# Patient Record
Sex: FEMALE | Race: WHITE | NOT HISPANIC OR LATINO | Employment: FULL TIME | URBAN - METROPOLITAN AREA
[De-identification: names, ages, dates, MRNs, and addresses within clinical notes are randomized per-mention and may not be internally consistent; named-entity substitution may affect disease eponyms.]

---

## 2023-12-15 ENCOUNTER — OFFICE VISIT (OUTPATIENT)
Dept: PRIMARY CARE CLINIC | Facility: CLINIC | Age: 43
End: 2023-12-15
Payer: COMMERCIAL

## 2023-12-15 VITALS
TEMPERATURE: 97 F | HEIGHT: 64 IN | OXYGEN SATURATION: 99 % | RESPIRATION RATE: 18 BRPM | HEART RATE: 93 BPM | DIASTOLIC BLOOD PRESSURE: 90 MMHG | WEIGHT: 233.69 LBS | BODY MASS INDEX: 39.9 KG/M2 | SYSTOLIC BLOOD PRESSURE: 134 MMHG

## 2023-12-15 DIAGNOSIS — E88.810 METABOLIC SYNDROME: ICD-10-CM

## 2023-12-15 DIAGNOSIS — Z83.3 FAMILY HISTORY OF DIABETES MELLITUS (DM): ICD-10-CM

## 2023-12-15 DIAGNOSIS — G43.909 MIGRAINE SYNDROME: ICD-10-CM

## 2023-12-15 DIAGNOSIS — E88.819 INSULIN RESISTANCE: ICD-10-CM

## 2023-12-15 DIAGNOSIS — F33.41 RECURRENT MAJOR DEPRESSIVE DISORDER, IN PARTIAL REMISSION: ICD-10-CM

## 2023-12-15 DIAGNOSIS — R63.5 WEIGHT GAIN: ICD-10-CM

## 2023-12-15 DIAGNOSIS — I10 ESSENTIAL HYPERTENSION: ICD-10-CM

## 2023-12-15 DIAGNOSIS — E66.01 CLASS 3 SEVERE OBESITY DUE TO EXCESS CALORIES WITH SERIOUS COMORBIDITY AND BODY MASS INDEX (BMI) OF 40.0 TO 44.9 IN ADULT: ICD-10-CM

## 2023-12-15 PROBLEM — E66.813 CLASS 3 SEVERE OBESITY DUE TO EXCESS CALORIES WITH SERIOUS COMORBIDITY AND BODY MASS INDEX (BMI) OF 40.0 TO 44.9 IN ADULT: Status: ACTIVE | Noted: 2023-12-15

## 2023-12-15 PROCEDURE — 99204 OFFICE O/P NEW MOD 45 MIN: CPT | Mod: S$GLB,,, | Performed by: FAMILY MEDICINE

## 2023-12-15 PROCEDURE — 99999 PR PBB SHADOW E&M-EST. PATIENT-LVL IV: CPT | Mod: PBBFAC,,, | Performed by: FAMILY MEDICINE

## 2023-12-15 PROCEDURE — 99214 OFFICE O/P EST MOD 30 MIN: CPT | Mod: PBBFAC | Performed by: FAMILY MEDICINE

## 2023-12-15 PROCEDURE — 99999 PR PBB SHADOW E&M-EST. PATIENT-LVL IV: ICD-10-PCS | Mod: PBBFAC,,, | Performed by: FAMILY MEDICINE

## 2023-12-15 PROCEDURE — 99204 PR OFFICE/OUTPT VISIT, NEW, LEVL IV, 45-59 MIN: ICD-10-PCS | Mod: S$GLB,,, | Performed by: FAMILY MEDICINE

## 2023-12-15 RX ORDER — NALTREXONE HYDROCHLORIDE 50 MG/1
TABLET, FILM COATED ORAL
Qty: 15 TABLET | Refills: 0 | Status: SHIPPED | OUTPATIENT
Start: 2023-12-15

## 2023-12-15 NOTE — PROGRESS NOTES
Subjective       Referring MD: Fredo  PCP:  Jimmy  BMI noted 40  Diet: variable  Exercise/Activity: light  Sleep: fair  Stressors: controlled  Anxiety/Depression Screen/PHQ-2: stable      Patient ID: Estefania Crenshaw is a 43 y.o. female.    Labs reviewed  Lipids, tsh okay  + fam hx of DM II    IR per panel      Chief Complaint: Establish Care (Weight loss)    Hx of chronic migraines--have been controlled. She did not respond well to topamax. Had some success with propranolol but had to change for better bp control. Now on relpax.     BP has overall been good.     Taking wellbutrin xl for depression. Depression stable without crisis but some increased stressors with work change (worked for law firm; now working from home). Tolerating wellbutrin; She tends to be an emotional eater.     Pt feels like weight has been increasing. She has an IR panel that is c/w insulin resistance.   Tried metformin and refuses to take again due to gi upset. Tried IR and XR with slow titration.     Hx of htn, some hyperglycemia and hyperinsulinemia on IR panel, waist size.     Has tried low carb keto as a jump start in the past.     Food recall:  Bfast: may skip toast, peanut butter, egg sandwich  Lunch: leftovers from dinner, sandwich  Dinner: cooks at home; does not enjoy:  protein, veggie, starch  Does not like veggies  Snack: chips, crackers, sweets ( enjoys)  Water: adequate--could do better  Sugar Sweetened beverages:   Diet pepsi  Etoh: none    HPI       Objective     PAST MEDICAL HISTORY:  Past Medical History:   Diagnosis Date    Anxiety     Depression     History of PCOS     Hypertension     Infertility, female     Migraine     Mixed hyperlipidemia     Thrombocytosis          PAST SURGICAL HISTORY:  Past Surgical History:   Procedure Laterality Date    CHOLECYSTECTOMY      DENTAL SURGERY      OOPHORECTOMY Left 2016       FAMILY HISTORY:  Family History   Problem Relation Age of Onset    Diabetes Mother     Endocrine  tumor Mother     Depression Mother     Heart disease Father     Diabetes Father     Skin cancer Father     Colon cancer Maternal Grandmother     Heart disease Maternal Grandfather     Heart disease Paternal Grandfather           SOCIAL HISTORY:  Social History     Social History Narrative    Not on file       MEDICATIONS:  Medications have been reviewed.    ALLERGIES:  Allergies have been reviewed.    Vitals:    12/15/23 1004   BP: (!) 134/90   Pulse: 93   Resp: 18   Temp: 97.1 °F (36.2 °C)     Wt Readings from Last 10 Encounters:   12/15/23 106 kg (233 lb 11 oz)   11/06/23 106.1 kg (234 lb)   10/31/22 96.6 kg (213 lb)   10/25/21 104.8 kg (231 lb)       Lab Results   Component Value Date    WBC 9.78 11/16/2023    HGB 12.8 11/16/2023    HCT 40.1 11/16/2023     11/16/2023    CHOL 196 11/16/2023    TRIG 103 11/16/2023    HDL 52 11/16/2023    ALT 16 11/16/2023    AST 21 11/16/2023     11/16/2023    K 4.3 11/16/2023     11/16/2023    CREATININE 0.7 11/16/2023    BUN 10 11/16/2023    CO2 25 11/16/2023    TSH 2.03 09/01/2023    HGBA1C 5.5 09/01/2023       Review of Systems   Constitutional:  Negative for activity change, appetite change, fatigue and fever.   HENT:  Negative for mouth dryness and goiter.    Eyes:  Negative for visual disturbance.   Respiratory:  Negative for apnea, cough, chest tightness and shortness of breath.    Cardiovascular:  Negative for chest pain, palpitations and leg swelling.   Gastrointestinal:  Negative for abdominal pain, constipation, diarrhea, nausea, vomiting and reflux.   Endocrine: Negative for cold intolerance, heat intolerance, polydipsia, polyphagia and polyuria.   Genitourinary:  Negative for frequency and menstrual problem.   Musculoskeletal:  Negative for arthralgias and myalgias.   Integumentary:  Negative for color change and rash.   Psychiatric/Behavioral:  Negative for self-injury, sleep disturbance and suicidal ideas. The patient is not nervous/anxious.         Physical Exam  Vitals and nursing note reviewed.   Constitutional:       General: She is not in acute distress.  HENT:      Head: Normocephalic and atraumatic.      Mouth/Throat:      Pharynx: Oropharynx is clear.   Eyes:      General: No scleral icterus.     Pupils: Pupils are equal, round, and reactive to light.   Neck:      Comments: No TM  Cardiovascular:      Rate and Rhythm: Normal rate and regular rhythm.      Pulses: Normal pulses.      Heart sounds: Normal heart sounds. No murmur heard.     No friction rub. No gallop.   Pulmonary:      Effort: Pulmonary effort is normal. No respiratory distress.      Breath sounds: Normal breath sounds. No wheezing, rhonchi or rales.   Abdominal:      General: Bowel sounds are normal. There is no distension.      Palpations: Abdomen is soft.      Tenderness: There is no abdominal tenderness.   Musculoskeletal:         General: No swelling.      Cervical back: Normal range of motion and neck supple. No tenderness.   Lymphadenopathy:      Cervical: No cervical adenopathy.   Skin:     General: Skin is warm.      Capillary Refill: Capillary refill takes less than 2 seconds.      Findings: No erythema or rash.   Neurological:      Mental Status: She is alert and oriented to person, place, and time.   Psychiatric:         Mood and Affect: Mood normal.         Behavior: Behavior normal.              Assessment and Plan       ICD-10-CM ICD-9-CM   1. Insulin resistance  E88.819 277.7   2. Metabolic syndrome  E88.810 277.7   3. Weight gain  R63.5 783.1   4. Recurrent major depressive disorder, in partial remission  F33.41 296.35   5. Migraine syndrome  G43.909 346.00   6. Essential hypertension  I10 401.9   7. Class 3 severe obesity due to excess calories with serious comorbidity and body mass index (BMI) of 40.0 to 44.9 in adult  E66.01 278.01    Z68.41 V85.41   8. Family history of diabetes mellitus (DM)  Z83.3 V18.0      Reviewed with pt risks/benefits/se's of naltrexone at  length  Will consider naltrexone; pt advised to monitor for any se's including: nausea, fogginess, mh changes, fatigue  Reviewed with pt should be off this med 7 days prior and resume 7 days after last dosage  Utd     Pt declines surgery  Insulin resistance  -     Ambulatory referral/consult to [facility]    Metabolic syndrome  Comments:  failed    Weight gain  -     Ambulatory referral/consult to [facility]    Recurrent major depressive disorder, in partial remission    Migraine syndrome  Chronic/stable    Essential hypertension  Chronic/stable    Class 3 severe obesity due to excess calories with serious comorbidity and body mass index (BMI) of 40.0 to 44.9 in adult    Reviewed with pt risks/benefits/se's of naltrexone at length  Will consider naltrexone; pt advised to monitor for any se's including: nausea, fogginess, mh changes, fatigue  Reviewed with pt should be off this med 7 days prior and resume 7 days after last dosage  Utd     Pt to check for glp1 coverage in future  Rx naltrexone sent in     Family history of diabetes mellitus (DM)           Follow up in about 8 weeks (around 2/9/2024), or if symptoms worsen or fail to improve.  Reviewed with pt glp1  Risks/benefits/common side effects of medication discussed with patient at length. UTD patient handout given. (mychart msg)  D/W pt at length potential pharmacologic options; she desires consideration of ozempic or mounjaro. Risks/benefits/common side effects of ozempic or mounjaro d/w pt including nausea and pain at injection site; she is aware should not get pregnant while taking and not approved while breastfeeding. NO personal/fam hx of MEN or medullary thyroid cancer. No hx of pancreatitis. Instructed pt how to use with demo pen; pt practiced in office. Pt advised if approved will get pt handout from pharmacy. Pt has no hx of thyroid nodules or biliary disease/gallstones. DW pt with substantial or rapid weight loss gallstones  could develop. Also dw pt glp-1 MOA and common side effects.     Pt is on birth control patches; feels is doing well; compliant

## 2023-12-15 NOTE — PATIENT INSTRUCTIONS
"  Check on your insurance's formulary (drug plan) web site to see if Wegovy (semaglutide 2.4 mg) is covered. You may also go to [email or web address]-[email or web address] to check coverage, but that information may not be as accurate.   Check for coverage for Zepbound (terzepatide  5, 10 and 15 mg).  These particular name brands are important. They are not the same as the diabetes brands, and will not be covered the same.      Goal: < 25 grams added sugar/day  Protein 30 grams protein/meal   1 starch or less/meal        Contrave    Please check with your neurologist: is it ok take naltrexone 12. 5 mg once daily?    Please send me [employer] update soon.    Access En[employer] Online for additional drug information, tools, and databases.  Copyright 0537-7190 [employer] All rights reserved.  Contributor Disclosures  (For additional information see "Naltrexone: Drug information")    You must carefully read the "Consumer Information Use and Disclaimer" below in order to understand and correctly use this information.  Brand Names:   Vivitrol    Brand Names: Derick  APO-Naltrexone;     ReVia    What is this drug used for?  It is used to help keep you alcohol-free.  It is used to help keep you opioid-free. Opioid drugs include heroin and prescription pain drugs like oxycodone and morphine.  It may be given to you for other reasons. Talk with the doctor.    What do I need to tell my doctor BEFORE I take this drug?  If you are allergic to this drug; any part of this drug; or any other drugs, foods, or substances. Tell your doctor about the allergy and what signs you had.  If you are taking an opioid drug like morphine or oxycodone, are addicted to an opioid drug, or are having withdrawal signs.  If you have taken a pain drug within the past 7 to 14 days.  This is not a list of all drugs or health problems that interact with this drug.  Tell your doctor and pharmacist about all of your drugs (prescription or OTC, " natural products, vitamins) and health problems. You must check to make sure that it is safe for you to take this drug with all of your drugs and health problems. Do not start, stop, or change the dose of any drug without checking with your doctor.    What are some things I need to know or do while I take this drug?  All products:  Tell all of your health care providers that you take this drug. This includes your doctors, nurses, pharmacists, and dentists.  Avoid driving and doing other tasks or actions that call for you to be alert until you see how this drug affects you.  Have blood work checked as you have been told by the doctor. Talk with the doctor.  This drug may affect certain lab tests. Tell all of your health care providers and lab workers that you take this drug.  Avoid drinking alcohol while taking this drug.  Do not take opioid drugs while you are taking this drug. Opioid drugs will not work. Do not take more opioid drugs to try to get them to work. Doing this may cause severe injury, coma, or death.  A drug called naloxone can be used to help treat an opioid overdose. Your doctor may order naloxone for you to keep with you if it is needed. If you have questions about how to get or use naloxone, talk with your doctor or pharmacist. If you think there has been an opioid overdose, get medical care right away even if naloxone has been used.  If you are addicted to opioid drugs and are given this drug, you may have signs of withdrawal. If you have questions, talk with your doctor.  Tell your doctor if you are pregnant, plan on getting pregnant, or are breast-feeding. You will need to talk about the benefits and risks to you and the baby.  Tablets:  Have patient safety card with you at all times.  People taking this drug to keep an opioid-free state may get more effects from opioid drugs when this drug is stopped. Even low amounts of opioid drugs or amounts that you have used before may lead to overdose and  death. If you have questions, talk with your doctor.  Injection:  After you get a dose of this drug, its blocking effect on opioids will go away over time. Low amounts of opioid drugs or amounts that you have used before may lead to overdose and death. This effect may also be seen when it is time for your next dose of this drug, if you miss a dose, if you stop treatment, or if you have gone through treatment and are opioid-free.  Very bad skin problems have happened where the shot was given. Sometimes surgery was needed for these skin problems. Talk with the doctor.  A type of lung infection caused by an allergic reaction has happened with this drug. This may need to be treated in a hospital.    What are some side effects that I need to call my doctor about right away?  WARNING/CAUTION: Even though it may be rare, some people may have very bad and sometimes deadly side effects when taking a drug. Tell your doctor or get medical help right away if you have any of the following signs or symptoms that may be related to a very bad side effect:  All products:  Signs of an allergic reaction, like rash; hives; itching; red, swollen, blistered, or peeling skin with or without fever; wheezing; tightness in the chest or throat; trouble breathing, swallowing, or talking; unusual hoarseness; or swelling of the mouth, face, lips, tongue, or throat.  Signs of liver problems like dark urine, tiredness, decreased appetite, upset stomach or stomach pain, light-colored stools, throwing up, or yellow skin or eyes.  Signs of high blood pressure like very bad headache or dizziness, passing out, or change in eyesight.  New or worse behavior or mood changes like depression or thoughts of suicide.  Feeling confused.  Trouble breathing, slow breathing, or shallow breathing.  Sex problems in males.  Injection:  Signs of lung or breathing problems like shortness of breath or other trouble breathing, cough, or fever.  Area that feels hard,  blisters, dark scab, lumps, open wound, pain, swelling, or other very bad skin irritation where the shot was given.    What are some other side effects of this drug?  All drugs may cause side effects. However, many people have no side effects or only have minor side effects. Call your doctor or get medical help if any of these side effects or any other side effects bother you or do not go away:  All products:  Feeling nervous and excitable.  Anxiety.  Headache.  Muscle cramps.  Constipation, diarrhea, stomach pain, upset stomach, throwing up, or decreased appetite.  Unusual thirst.  Trouble sleeping.  Feeling dizzy, sleepy, tired, or weak.  Back, muscle, or joint pain.  Signs of a common cold.  Tooth pain.  Injection:  Irritation where the shot is given.  Nose or throat irritation.  Dry mouth.  These are not all of the side effects that may occur. If you have questions about side effects, call your doctor. Call your doctor for medical advice about side effects.  You may report side effects to your national health agency.    How is this drug best taken?  Use this drug as ordered by your doctor. Read all information given to you. Follow all instructions closely.  Tablets:  Keep taking this drug as you have been told by your doctor or other health care provider, even if you feel well.  Injection:  It is given as a shot into a muscle.    What do I do if I miss a dose?  Tablets:  Take a missed dose as soon as you think about it.  If it is close to the time for your next dose, skip the missed dose and go back to your normal time.  Do not take 2 doses at the same time or extra doses.  Injection:  Call your doctor to find out what to do.    How do I store and/or throw out this drug?  Tablets:  Store at room temperature in a dry place. Do not store in a bathroom.  Injection:  If you need to store this drug at home, talk with your doctor, nurse, or pharmacist about how to store it.  All products:  Keep all drugs in a safe  place. Keep all drugs out of the reach of children and pets.  Throw away unused or  drugs. Do not flush down a toilet or pour down a drain unless you are told to do so. Check with your pharmacist if you have questions about the best way to throw out drugs. There may be drug take-back programs in your area.    General drug facts  If your symptoms or health problems do not get better or if they become worse, call your doctor.  Do not share your drugs with others and do not take anyone else's drugs.  Some drugs may have another patient information leaflet. If you have any questions about this drug, please talk with your doctor, nurse, pharmacist, or other health care provider.  If you think there has been an overdose, call your poison control center or get medical care right away. Be ready to tell or show what was taken, how much, and when it happened.    Last Reviewed Date  2021  Consumer Information Use and Disclaimer  This generalized information is a limited summary of diagnosis, treatment, and/or medication information. It is not meant to be comprehensive and should be used as a tool to help the user understand and/or assess potential diagnostic and treatment options. It does NOT include all information about conditions, treatments, medications, side effects, or risks that may apply to a specific patient. It is not intended to be medical advice or a substitute for the medical advice, diagnosis, or treatment of a health care provider based on the health care provider's examination and assessment of a patient's specific and unique circumstances. Patients must speak with a health care provider for complete information about their health, medical questions, and treatment options, including any risks or benefits regarding use of medications. This information does not endorse any treatments or medications as safe, effective, or approved for treating a specific patient. UpToDate, Inc. and its affiliates  "disclaim any warranty or liability relating to this information or the use thereof. The use of this information is governed by the Terms of Use, available at https://www.wolterskluwer.com/en/know/clinical-effectiveness-terms.    © 2023 UpToDate, Inc. and its affiliates and/or licensors. All rights reserved.  Use of getbetter! is subject to the Terms of Use.              PRODUCE  [] All fresh fruit   [] All fresh vegetables   [] All fresh herbs  [] All herb purees + pastes  [] Pre-spiralized vegetable noodles   [] Steam-In-The-Bag begetables  [] Riced cauliflower  [] Jicama sticks  [] Love Beets  all varieties  [] Wholly Guacamole  all varieties  [] Hummus  all varieties, chickpea + vegetable  [] Tofu Shirataki noodles    [] Tofu  all varieties  [] Tempeh  all varieties    PROTEIN  CHICKEN   [] Boneless, skinless breasts  [] Boneless, skinless thighs  [] Ground chicken breast, at least 93% lean  [] Chicken breast cutlet  [] Aidell's  Chicken Sausage + Chicken Meatballs    TURKEY   [] Turkey breast tenderloin   [] Ground turkey breast, at least 93% lean  [] Tonio Naturals  Turkey Sausage    BEEF  [] Tenderloin  [] Sirloin  [] Top Loin  [] Flank Steak  [] Round Steak  [] Filet  [] Lean ground beef, at least 93% lean + grass-fed preferable    PORK  [] Tenderloin  [] Pork Chop  [] Center Cut  [] [place] Naturals  No-Sugar Delgado    BISON  [] [place]  90 - 95% lean    SEAFOOD  [] All fresh fish + seafood; locally sourced when possible  [] Smoked salmon    HEAT + EAT ENTREES   [] Sven's Natural Foods  Chicken, Pork, Beef  [] Travon  "All Natural" Grilled Chicken Breast + Strips, all varieties    SAUCES SPREADS + DIPS  [] Bitchin Sauce  Original, Chipotle, Cilantro [place]  [] Roseanne's Kitchen  Tzatziki Yogurt Dip, Babaganoush, Hummus  [] Wholly Guacamole  all varieties  [] Hummus  all varieties  [] [place] Gringo Salsa  all varieties  [] Mrs. Kavin's Salsa  all varieties  [] Ayse's All Natural BBQ " Sauce  [] Primal Kitchen  Phillip, Ketchup, BBQ Sauce  [] Primal Kitchen Pasta Sauce  Roasted Garlic, Tomato Basil, no-dairy Vodka Sauce  [] Sal & Arlene's  HeartSmart Pasta Sauce    DAIRY/DAIRY SUBSTITUTES/EGGS  EGGS   [] All eggs  cage-free, pasture-raise preferable  [] Crepini  egg wraps  [] Vital Farms  Pasture-Raised Egg Bites  [] JUST Egg [vegan]     CREAMERS   [] [place]  Better Half, original + vanilla unsweetened  [] NutPods  all varieties    MILK   [] Horizon Organic  all varieties except chocolate  [] Organic Valley  all varieties except chocolate  [] Organic Valley  ultra-filtered, reduced fat milk     PLANT_BASED MILK ALTERNATIVES  [] All unsweetened almond milks  original, vanilla + chocolate  [] Ripple  unsweetened   [] Milkadamia  original +_ vanilla, unsweetened   [] Forager  original + vanilla, unsweetened   [] Silk Organic  soy milk, unsweetened  [] Oatly  unsweetened  [] [place]ia  regular + protein-fortified oat milk, unsweetened     CHEESES  [] Regular or reduced fat cheeses  [] BelGioso  Fresh Mozzarella Snack Packs, Parmesan Power-full Snack   [] Goat cheese  [] Fresh mozzarella  [] String cheese  all varieties  [] Yulia Cottage Cheese  [] Michelle's Cultured Cottage Cheese  [] Toya Life 'Just Like Mozzarella'  plant-based shreds and other varieties  [] Parmela Creamery  plant-based shredded cheese    YOGURT  [] Fage  2% low-fat, plain  [] Siggi's  plain, vanilla  [] Chobani Greek  nonfat + whole milk yogurt, plain   [] Chobani Less Sugar  all flavored varieties   [] Oikos Greek  nonfat, plain  [] Two Good  all varieties   [] Khmer Provisions  plain  [] [place] Organic  low-fat + nonfat, plain  [] [place] Farm  goat milk yogurt, plain  [] Kefit  unsweetened, plain  [] Forager  Greek style unsweetened, plain [dairy-free]  [] [place]  unsweetened Greek style, plain [dairy-free]  [] [place]  almond milk yogurt, vanilla or plain, unsweetened  [dairy-free]    FREEZER SECTION  FROZEN VEGGIES  [] All plain frozen veggies + greens [e.g. broccoli, brussels, carrots, okra, mushrooms, zucchini, yellow squash, butternut squash, kale, spinach, brigette greens]  [] Riced veggies [e.g. cauliflower, broccoli, butternut squash]  [] Edamame  all varieties  [] Green Giant  [] Veggie Spirals  [] Marinated Veggies [e.g. eggplant, peppers, zucchini]  [] Simply Steam [place] Sprouts  [] Birds Eye  [] Power Blend Italian Style  lentils, broccoli, kale, zucchini  []  P[place] Sprouts & Carrots  [] Oven Roasters Broccoli & Cauliflower  [] [place] Blend  [] Tattooed   [] Green Bean Blend  [] Farmer's Market Ratatouille  [] Butter Balsamic Glazed Vegetables  [] Riced Cauliflower & Quinoa Mediterranean Style  [] Pascale's Good Life  Southern Style Greens [sauteed kale + onion]    FROZEN FRUITS  [] All unsweetened frozen fruits  all varieties  [] Dole Fruits & Veggie Blends  Berries 'n Kale  [] Dole Mix-ins  Triple Berry     FROZEN ENTREES  [] The Good Kitchen meals  all varieties [ e.g. Chili Lime Chicken Over Riced Cauliflower]  [] Premium Paleo  Not Kulwinder Momma's Meatloaf  [] Primal Kitchen  Chicken Pesto + Steak Fajitas w/ Peppers & Onions  [] Eating Well Frozen Entrees  Butter Chicken Masala, Steak Carne Asada, Creamy Pesto Chicken, Chicken + Wild Rice Stroganoff, Yellow Barragan Chicken, Sun-dried Tomato Chicken, Chicken Lo Mein  [] Realgood Entree Bowls  [place] Inspired Beef Bowl over Riced Cauliflower, Chicken Burrito Bowl   [] Great Karma Coconut [place]  [] Asuncion's  Tamale Bryn with Black Beans, Vegetable Lasagna  [] Kashi Mayan [place] Bake  [] Healthy Choice  Simply Steamers Chicken Fried Rice  [] Basil Pesto Chicken & Ukrainian Style Pork Power Bowls  [] Tattooed   Enchilada Bowl  [] Germaine Farms  Spicy Black Bean Burgers    FROZEN PIZZAS  [] Cauli'flour Foods  Cauliflower Pizza Crusts  [] Outer Aisle  Cauliflower Crust  [] Asuncion's   Veggie Crust Cheese Pizza  [] Quest Pizza     VEGETARIAN PRODUCTS  [] Beyond Meat  ground 'meat' + grilled 'chicken' strips  [] Tofurkey  Original Italian Sausage + Original Tempeh  [] Gardein  Beefless Ground + Meatless Meatballs  [] eGrmaine Farms Grillers  Original Burger, Crumbles, Meatballs    ICE CREAMS + FROZEN DESSERTS  [] Halo Top  regular + keto series, pops  [] Rebel  ice cream + dessert sandwiches  [] Enlightened  ice cream + bars  [] Nightfood  ice cream  [] Realgood  ice cream  [] Arctic Zero Fit  frozen pint  [] The Frozen Farmer  sorbets  [] Wholly Rollies  Protein Balls, all varieties  [] Dream Pops  Coconut Latte    FROZEN BREAKFASTS  [] Realgood  Breakfast Sandwiches on Cauliflower Cheesy Bread  [] Rebel  ice cream + dessert sandwiches  [] Enlightened  ice cream + bars  [] Nightfood  ice cream  [] Realgood  ice cream  [] Arctic Zero Fit  frozen pint  [] The Frozen Farmer  sorbets  [] Wholly Rollies  Protein Balls, all varieties  [] Dream Pops  Coconut Latte    BREADS/BUNS/WRAPS  [] Patrick Bread: All Types - In Freezer Section   [] Flat Out Light Wraps - All Varieties   [] Flat Out Protein Up Carb Down Flat Bread   [] Kontos Whole Wheat Pocket Noemy   [] Glen BEKAH 100% Whole Wheat Tortillas   [] [facility] Activation Lifey Tortillas - Smart & Delicious; 50 or 80-calorie   [] Nature's Own 100% Whole Wheat Bread   [] Orowheat Healthful - 100% Whole Wheat Slice Bread and [place] Thins   [] Orowheat Healthful - Whole Wheat Nuts & Grain Bread; Flax & Seed Bread   [] Pepperidge Farm Natural Whole Grain 15 Bread   [] Pepperidge Farm Natural Whole Grain English Muffin - 100% Whole Wheat   [] [place] Farm Very Thin 100% Whole Wheat   [] Renuka Jean 45 Calories and Delightful   [] Lamonte' 100% Whole Wheat Thin-Sliced Bagels and English Muffins   [] Western Bagel: Perfect 10     GLUTEN FREE  [] Kiet's Gluten Free Bread   [] Telfair Bakehouse 7-Grain Gluten Free Bread     LEGUME PASTA   []  Explore Asian Organic Black Bean Spaghetti   [] Modern Table   [] Tolerant Foods       NUT BUTTERS & JELLIES    NUT BUTTERS   [] Better'n Chocolate: Coconut Chocolate Peanut Butter Spread   [] Better'n Peanut Butter - All Types   [] Earth Balance Coconut and Peanut Spread   [] Ravi's Nut [place]   [] MaraNatha: All Natural Roasted Cashew Butter - [place] or Creamy   [] MaraNatha: Roasted Peanut Butter   [] Nuts 'N More Peanut [place] - All Flavors   [] PB2 Powder - Original or Chocolate   [] Skippy Natural - Creamy, Super Chunk   [] Smart Balance Peanut Butter - [place] or Creamy   [] Peanut Butter & Company:   [] Smooth , Crunch Time, The Heat Is On, Old Fashioned Smooth, Mighty Nut- Powdered Peanut Butter, Squeeze Pack   [] Smucker's Natural Peanut Butter - [place] or Creamy   [] Sunbutter Nut Butter   [] Wild Friends Protein Peanut Butter/[place] o Butter - Vanilla or Chocolate     JELLIES  o Polaner's All Fruit   o Clearly Organic Best Choice: Strawberry Fruit Spread       SNACKS    BARS  [] Kashi Bars - Chewy or Crunchy; Honey [place] o Flax or Peanut Butter   [] KIND Bars - 5 Grams of Sugar or Less   [] KIND Protein Bars - Strong and KIND   [] Nature Valley Protein Bar - All Varieties   [] Nature Valley Roasted Nut Crunch - [place] Crunch; Peanut Crunch   [] Critical access hospital Valley Simple Nut Bar - Roasted Peanut & Honey   [] Critical access hospital Valley Simple Nut Bar - [place], Cashew & Sea Salt   [] Critical access hospital Valley Nut [place] Bar - Salted Caramel Peanut   [] Think Thin Protein Bars   [] Quest Bars, Power Crunch Bars, Pure Protein Bars     BEEF JERKY - NITRATE FREE   [] Game On   [] Grass Run Farms   [] Krave   [] Ostrim   [] Perky Jerky   [] Primal Strips Meatless Vegan Jerky   [] [place]     CHIPS   [] Beanitos Chips   [] Fruit Crisps - e.g. Brother's-All-Natural, Bare Fruit, Yoga Chips   [] Stacey's Soy Crisps: 1.3 ounce bag   [] Quest Protein Chips   [] Wasa Whole Wheat Crisp Bread     CRACKERS  [] Estefania's Gone Crackers   []  Nabisco Triscuit: Regular and Thin Crisp Crackers   [] Vans Say Cheese Crackers (G-F)     POPCORN/NUTS   [] Navdeep Sosa's Smart Pop Popcorn - Single Serving   [] 100-Calorie Pack of Nuts - All Varieties     PROTEIN POWDERS & DRINKS  []  Protein -  Whey Protein Powder   [] Garden of Life Raw Protein Powder   [] Iconic Ready-To-Drink Protein Drink   [] Toth One Protein Powder   [] VegaSport Protein Powder     SALSA/HUMMUS/DIPS   [] Eat Well Embrace Life: Zesty Sriracha Carrot o Hummus   [] Pre-Portioned Guacamole Packs   [] Nancy's   [] Tostitos Restaurant Style Salsa       SOUPS   [] Asuncion's Organic Soups - Lentil, Vegetable, Split Pea, Low-Sodium     CANNED GOODS   [] 100% Pure Pumpkin   [] BlueRunner Creole Cream-Style Red Beans or Navy Beans   [] Cajun Power Chicken Gumbo Base   [] Chicken of the [place] Aire Stonewall   [] Archana Fresh Cut Sliced Beets   [] Hormel Breast of Chicken in Water   [] LeSuer Tender Baby Whole Carrots   [] McIlhenny Tabasco Earth Starter   [] Novaist: Chunk Lite Tuna in Water, Gourmet Select Pouches   [] StarKist: Yellowfin Tuna Fillets   [] Trappey's: Kidney, Butter, Lucas, Black Eye, Field, and Black Beans   [] STANISLAV Santos's Turnip Greens or Vic Spinach     CONDIMENTS/ SAUCES/SPREADS/ SPICES  [] Vitor Perez's Magic Seasonings - Regular or Salt Free   [] Cruzito Topete's Sauces - All Flavors   [] Laughing Cow Light - All Flavors   [] Dash Salt-Free Marinade - All Flavors   [] Michael & Arlene's: Heart Smart Pasta Sauces   [] Tabasco     SALAD DRESSINGS  [] Bere's Naturals: Lite Honey Mustard   [] Toñito's Own: Lighten Up Salad Dressing - All Varieties   [] OPA Greek Yogurt Dressings - Ranch, Blue o Cheese, Caesar, Feta Dill     SWEETENERS  [] Sweet [place] Sweetener   [] Swerve   [] Truvia     BEVERAGES  [] Coconut Water   [] Crystal Light PURE - All Flavors   [] Honest Tea: Just Green Tea, Unsweetened   [] Kombucha Tea   [] [place]   [] [place] Sisters Bloody Estefania Mix    [] Metromint - Zero-Sugar; All Natural Flavored   [] Ramakrishna - Plain or Flavored   [] Irasema Magana   [] Steaz - Zero-Sugar, All-Natural, Sparkling Tea   [] Tea Bags: Any Brand - e.g. Nkechi, Yogi, Tazzo, Celestial   [] V8 100% Vegetable Juice   [] Vitamin Water Zero   [] Water   [] Zevia - Stevia Sweetened Soft Drink     BEER/ZARIA/LIQUORS  []Tejada's Premier Light 64 Calories   [] Bud Select - 55 Calories   [] [place] Sisters Bloody Estefania Mix   [] Man Genuine Draft - 64 Calories   [] Red or White Wine - All Varieties     CEREALS: HOT/COLD   [] Shayy'homa Phelan's Original Cereal  [] Sussy's Mill Oat Bran Hot Cereal - Cracked Wheat, Multi-Grain  [] Kashi GoLean Cereal  [] Kashi GoLean Hot Cereal packets - Vanilla; Honey Cinnamon  [] Félix's Special K Protein Cereal  [] Yobany's Steel Cut Surinamese Oatmeal  [] Nature's Path Smart Bran  [] Congregation Instant Oatmeal packet, Original  [] Congregation Old Fashioned Congregation Oats  [] Uncle Xavier's Whole Wheat & Flaxseed Original Cereal

## 2024-02-24 ENCOUNTER — OFFICE VISIT (OUTPATIENT)
Dept: URGENT CARE | Facility: CLINIC | Age: 44
End: 2024-02-24
Payer: COMMERCIAL

## 2024-02-24 VITALS
WEIGHT: 231.13 LBS | RESPIRATION RATE: 18 BRPM | BODY MASS INDEX: 39.46 KG/M2 | TEMPERATURE: 99 F | SYSTOLIC BLOOD PRESSURE: 152 MMHG | OXYGEN SATURATION: 98 % | DIASTOLIC BLOOD PRESSURE: 86 MMHG | HEIGHT: 64 IN | HEART RATE: 101 BPM

## 2024-02-24 DIAGNOSIS — R68.83 CHILLS (WITHOUT FEVER): Primary | ICD-10-CM

## 2024-02-24 DIAGNOSIS — R59.9 LYMPH NODE ENLARGEMENT: ICD-10-CM

## 2024-02-24 DIAGNOSIS — I10 ELEVATED BLOOD PRESSURE READING IN OFFICE WITH DIAGNOSIS OF HYPERTENSION: ICD-10-CM

## 2024-02-24 DIAGNOSIS — R53.83 FATIGUE, UNSPECIFIED TYPE: ICD-10-CM

## 2024-02-24 DIAGNOSIS — B00.1 COLD SORE: ICD-10-CM

## 2024-02-24 LAB
CTP QC/QA: YES
SARS-COV-2 AG RESP QL IA.RAPID: NEGATIVE

## 2024-02-24 PROCEDURE — 87811 SARS-COV-2 COVID19 W/OPTIC: CPT | Mod: QW,S$GLB,,

## 2024-02-24 PROCEDURE — 99203 OFFICE O/P NEW LOW 30 MIN: CPT | Mod: S$GLB,,,

## 2024-02-24 RX ORDER — ACYCLOVIR 400 MG/1
400 TABLET ORAL 3 TIMES DAILY
Qty: 30 TABLET | Refills: 0 | Status: SHIPPED | OUTPATIENT
Start: 2024-02-24 | End: 2024-03-05

## 2024-02-24 NOTE — PATIENT INSTRUCTIONS
Get plenty of rest  Drink plenty of fluids  Take antiviral medication as directed for at least 1 week  Continue to apply topical medication to cold sore  Warm compresses to swollen painful lymph nodes    - You must understand that you have received an Urgent Care treatment only and that you may be released before all of your medical problems are known or treated.   - You, the patient, will arrange for follow up care as instructed with your primary care provider or recommended specialist.   - If your condition worsens or fails to improve we recommend that you receive another evaluation at the ER immediately or contact your PCP to discuss your concerns, or return here.   - Please do not drive or make any important decisions for 24 hours if you have received any pain medications, sedatives or mood altering drugs during your visit.    Disclaimer: This document was drafted with the use of a voice recognition device and is likely to have sound alike errors.

## 2024-02-24 NOTE — PROGRESS NOTES
"Subjective:      Patient ID: Estefania Crenshaw is a 43 y.o. female.    Vitals:  height is 5' 4" (1.626 m) and weight is 104.9 kg (231 lb 2.4 oz). Her tympanic temperature is 98.6 °F (37 °C). Her blood pressure is 152/86 (abnormal) and her pulse is 101. Her respiration is 18 and oxygen saturation is 98%.     Chief Complaint: Diarrhea    42 yo female Patient presents with chills and possible low grade fever, cold sore on L upper lip, and feeling fatigued starting 2 days ago. She had been applying abbreva OTC medication without improvement to cold sore. Also tried taking tylenol. Noticed left her of neck was more swollen and painful lymph nodes present so she tried applying warm compresses to area without improvement. Patient states she took an at home covid test 2 days ago and was negative.  Patient also mentioned she suffers "with a little bit of IBS and having diarrhea" but normally just deals with it. Denies n/v, current fever, dental pain, sore throat, trouble breathing, cp, history of tonsil stones, ear pain or discharge. Allergic to codeine.      Of note, BP was high when she entered clinic. States she is due to take her BP meds when she gets home. Denies headache, vision changes, speech difficulty, back pain, urinary symptoms.     Diarrhea   This is a new problem. The current episode started in the past 7 days. The problem occurs 2 to 4 times per day. The problem has been unchanged. The stool consistency is described as Watery. The patient states that diarrhea does not awaken her from sleep. Associated symptoms include chills and a fever (possible low grade fever but did not check). Pertinent negatives include no abdominal pain, arthralgias, bloating, coughing, headaches, increased  flatus, myalgias, URI or vomiting. Nothing aggravates the symptoms. She has tried analgesics for the symptoms. The treatment provided no relief. There is no history of bowel resection, inflammatory bowel disease, irritable bowel " syndrome, malabsorption, a recent abdominal surgery or short gut syndrome.       Constitution: Positive for chills and fever (possible low grade fever but did not check).   HENT:  Positive for mouth sores (cold sore) and facial swelling. Negative for ear pain, ear discharge, dental problem, sore throat, trouble swallowing and voice change.    Cardiovascular:  Negative for chest pain.   Eyes:  Negative for eye discharge, eye itching and eye redness.   Respiratory:  Negative for cough and shortness of breath.    Gastrointestinal:  Negative for abdominal pain, nausea and vomiting.   Genitourinary:  Negative for dysuria, frequency and urgency.   Musculoskeletal:  Negative for joint pain and muscle ache.   Skin:  Negative for rash.   Allergic/Immunologic: Negative for sneezing.   Neurological:  Negative for headaches.      Objective:     Vitals:    02/24/24 1615   BP: (!) 152/86   Pulse:    Resp:    Temp:        Physical Exam   Constitutional: She is oriented to person, place, and time. She appears well-developed. She is cooperative.  Non-toxic appearance. She does not appear ill. No distress.   HENT:   Head: Normocephalic and atraumatic.   Ears:   Right Ear: Hearing, tympanic membrane, external ear and ear canal normal. No no drainage, swelling or cerumen not present. Tympanic membrane is not erythematous and not bulging. No middle ear effusion. impacted cerumen  Left Ear: Hearing, tympanic membrane, external ear and ear canal normal. No no drainage, swelling or cerumen not present. Tympanic membrane is not erythematous and not bulging.  No middle ear effusion. impacted cerumen  Nose: Nose normal. No mucosal edema, rhinorrhea or nasal deformity. No epistaxis. Right sinus exhibits no maxillary sinus tenderness and no frontal sinus tenderness. Left sinus exhibits no maxillary sinus tenderness and no frontal sinus tenderness.   Mouth/Throat: Uvula is midline, oropharynx is clear and moist and mucous membranes are normal.  Mucous membranes are moist. Mucous membranes are not dry. No trismus in the jaw. Normal dentition. No dental abscesses or uvula swelling. No oropharyngeal exudate, posterior oropharyngeal edema, posterior oropharyngeal erythema, tonsillar abscesses or cobblestoning. Tonsils are 1+ on the right. Tonsils are 1+ on the left. No tonsillar exudate.   Eyes: Conjunctivae and lids are normal. Pupils are equal, round, and reactive to light. Right eye exhibits no discharge. Left eye exhibits no discharge. No scleral icterus.   Neck: Trachea normal and phonation normal. Neck supple. No edema present. No erythema present. No neck rigidity present. No decreased range of motion present. No pain with movement present.   Cardiovascular: Normal rate, regular rhythm, normal heart sounds and normal pulses.   Pulmonary/Chest: Effort normal and breath sounds normal. No stridor. No respiratory distress. She has no decreased breath sounds. She has no wheezes. She has no rhonchi. She has no rales.   Abdominal: Normal appearance and bowel sounds are normal. She exhibits no distension. Soft. There is no abdominal tenderness. There is no rebound and no guarding.   Musculoskeletal: Normal range of motion.         General: No deformity. Normal range of motion.   Lymphadenopathy:        Head (right side): Submandibular and tonsillar adenopathy present. No preauricular and no posterior auricular adenopathy present.        Head (left side): Submandibular and tonsillar adenopathy present. No preauricular and no posterior auricular adenopathy present.   Neurological: She is alert and oriented to person, place, and time. She displays no weakness. She exhibits normal muscle tone. Coordination and gait normal.   Skin: Skin is warm, dry, intact, not diaphoretic, not pale and no rash.   Psychiatric: Her speech is normal and behavior is normal. Judgment and thought content normal.   Nursing note and vitals reviewed.      Assessment:     1. Chills (without  fever)    2. Cold sore    3. Lymph node enlargement    4. Fatigue, unspecified type    5. Elevated blood pressure reading in office with diagnosis of hypertension      Results for orders placed or performed in visit on 02/24/24   SARS Coronavirus 2 Antigen, POCT Manual Read   Result Value Ref Range    SARS Coronavirus 2 Antigen Negative Negative     Acceptable Yes        Plan:       Chills (without fever)  -     SARS Coronavirus 2 Antigen, POCT Manual Read    Cold sore  -     acyclovir (ZOVIRAX) 400 MG tablet; Take 1 tablet (400 mg total) by mouth 3 (three) times daily. for 10 days  Dispense: 30 tablet; Refill: 0    Lymph node enlargement  -     SARS Coronavirus 2 Antigen, POCT Manual Read    Fatigue, unspecified type  -     SARS Coronavirus 2 Antigen, POCT Manual Read    Elevated blood pressure reading in office with diagnosis of hypertension        Patient Instructions   Get plenty of rest  Drink plenty of fluids  Take antiviral medication as directed for at least 1 week  Continue to apply topical medication to cold sore  Warm compresses to swollen painful lymph nodes    - You must understand that you have received an Urgent Care treatment only and that you may be released before all of your medical problems are known or treated.   - You, the patient, will arrange for follow up care as instructed with your primary care provider or recommended specialist.   - If your condition worsens or fails to improve we recommend that you receive another evaluation at the ER immediately or contact your PCP to discuss your concerns, or return here.   - Please do not drive or make any important decisions for 24 hours if you have received any pain medications, sedatives or mood altering drugs during your visit.    Disclaimer: This document was drafted with the use of a voice recognition device and is likely to have sound alike errors.

## 2024-05-14 ENCOUNTER — OFFICE VISIT (OUTPATIENT)
Dept: PRIMARY CARE CLINIC | Facility: CLINIC | Age: 44
End: 2024-05-14
Payer: COMMERCIAL

## 2024-05-14 VITALS
RESPIRATION RATE: 18 BRPM | DIASTOLIC BLOOD PRESSURE: 84 MMHG | BODY MASS INDEX: 39.52 KG/M2 | HEIGHT: 64 IN | TEMPERATURE: 98 F | OXYGEN SATURATION: 97 % | WEIGHT: 231.5 LBS | SYSTOLIC BLOOD PRESSURE: 138 MMHG | HEART RATE: 102 BPM

## 2024-05-14 DIAGNOSIS — E28.2 PCOS (POLYCYSTIC OVARIAN SYNDROME): ICD-10-CM

## 2024-05-14 DIAGNOSIS — I10 ESSENTIAL HYPERTENSION: Primary | ICD-10-CM

## 2024-05-14 DIAGNOSIS — E88.819 INSULIN RESISTANCE: ICD-10-CM

## 2024-05-14 DIAGNOSIS — E66.01 CLASS 2 SEVERE OBESITY WITH SERIOUS COMORBIDITY AND BODY MASS INDEX (BMI) OF 39.0 TO 39.9 IN ADULT, UNSPECIFIED OBESITY TYPE: ICD-10-CM

## 2024-05-14 DIAGNOSIS — Z83.3 FAMILY HISTORY OF DIABETES MELLITUS (DM): ICD-10-CM

## 2024-05-14 PROBLEM — E66.812 CLASS 2 SEVERE OBESITY WITH SERIOUS COMORBIDITY AND BODY MASS INDEX (BMI) OF 39.0 TO 39.9 IN ADULT: Status: ACTIVE | Noted: 2023-12-15

## 2024-05-14 PROCEDURE — 3075F SYST BP GE 130 - 139MM HG: CPT | Mod: CPTII,S$GLB,, | Performed by: FAMILY MEDICINE

## 2024-05-14 PROCEDURE — 1160F RVW MEDS BY RX/DR IN RCRD: CPT | Mod: CPTII,S$GLB,, | Performed by: FAMILY MEDICINE

## 2024-05-14 PROCEDURE — 99999 PR PBB SHADOW E&M-EST. PATIENT-LVL V: CPT | Mod: PBBFAC,,, | Performed by: FAMILY MEDICINE

## 2024-05-14 PROCEDURE — 3008F BODY MASS INDEX DOCD: CPT | Mod: CPTII,S$GLB,, | Performed by: FAMILY MEDICINE

## 2024-05-14 PROCEDURE — 1159F MED LIST DOCD IN RCRD: CPT | Mod: CPTII,S$GLB,, | Performed by: FAMILY MEDICINE

## 2024-05-14 PROCEDURE — 3079F DIAST BP 80-89 MM HG: CPT | Mod: CPTII,S$GLB,, | Performed by: FAMILY MEDICINE

## 2024-05-14 PROCEDURE — 4010F ACE/ARB THERAPY RXD/TAKEN: CPT | Mod: CPTII,S$GLB,, | Performed by: FAMILY MEDICINE

## 2024-05-14 PROCEDURE — 99214 OFFICE O/P EST MOD 30 MIN: CPT | Mod: S$GLB,,, | Performed by: FAMILY MEDICINE

## 2024-05-14 RX ORDER — NARATRIPTAN 2.5 MG/1
TABLET ORAL
COMMUNITY

## 2024-05-14 NOTE — PROGRESS NOTES
Subjective     Patient ID: Estefania Crenshaw is a 43 y.o. female.    Chief Complaint: follow up    LOV 12/23 (only visit)    IR per IR panel (Dr Yoo)    Hx of migraines; did not respond well to topamax.     On wellbutrin xl. Still on wellbutrin. Some emotional eating. Gave pt naltrexone. Had gi upset so stopped it (diarrhea). Pt feels like has a more sensitive stomach.     Failed regular and xr metformin due to gi upset.     GLP-1 not covered per pt.    Dietary interventions:  Lifestyle changes: not as much exercise    BP stable on losartan.      Had recent botox for HA.     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       HPI       Objective     PAST MEDICAL HISTORY:  Past Medical History:   Diagnosis Date    Anxiety     Depression     History of PCOS     Hypertension     Infertility, female     Migraine     Mixed hyperlipidemia     Thrombocytosis          PAST SURGICAL HISTORY:  Past Surgical History:   Procedure Laterality Date    CHOLECYSTECTOMY      DENTAL SURGERY      OOPHORECTOMY Left 2016       FAMILY HISTORY:  Family History   Problem Relation Name Age of Onset    Diabetes Mother      Endocrine tumor Mother      Depression Mother      Heart disease Father      Diabetes Father      Skin cancer Father      Colon cancer Maternal Grandmother      Heart disease Maternal Grandfather      Heart disease Paternal Grandfather            SOCIAL HISTORY:  Social History     Social History Narrative    Not on file       MEDICATIONS:  Medications have been reviewed.    ALLERGIES:  Allergies have been reviewed.    Vitals:    05/14/24 1534   BP: 138/84   Pulse: 102   Resp: 18   Temp: 98.1 °F (36.7 °C)     Wt Readings from Last 10 Encounters:   05/14/24 105 kg (231 lb 7.7 oz)   02/24/24 104.9 kg (231 lb 2.4 oz)   12/15/23 106 kg (233 lb 11 oz)   11/06/23 106.1 kg (234 lb)   10/31/22 96.6 kg (213 lb)   10/25/21 104.8 kg (231 lb)       Lab Results    Component Value Date    WBC 9.78 11/16/2023    HGB 12.8 11/16/2023    HCT 40.1 11/16/2023     11/16/2023    CHOL 196 11/16/2023    TRIG 103 11/16/2023    HDL 52 11/16/2023    ALT 16 11/16/2023    AST 21 11/16/2023     11/16/2023    K 4.3 11/16/2023     11/16/2023    CREATININE 0.7 11/16/2023    BUN 10 11/16/2023    CO2 25 11/16/2023    TSH 2.03 09/01/2023    HGBA1C 5.5 09/01/2023       Review of Systems   Constitutional:  Negative for activity change, appetite change, fatigue and fever.   HENT:  Negative for mouth dryness and goiter.    Eyes:  Negative for visual disturbance.   Respiratory:  Negative for apnea, cough, chest tightness and shortness of breath.    Cardiovascular:  Negative for chest pain, palpitations and leg swelling.   Gastrointestinal:  Negative for abdominal pain, constipation, diarrhea, nausea, vomiting and reflux.   Endocrine: Negative for cold intolerance, heat intolerance, polydipsia, polyphagia and polyuria.   Genitourinary:  Negative for frequency and menstrual problem.   Musculoskeletal:  Negative for arthralgias and myalgias.   Integumentary:  Negative for color change and rash.   Neurological:  Negative for dizziness, vertigo, tremors, syncope, weakness and headaches.   Psychiatric/Behavioral:  Negative for self-injury, sleep disturbance and suicidal ideas. The patient is not nervous/anxious.        Physical Exam  Vitals and nursing note reviewed.   Constitutional:       General: She is not in acute distress.  HENT:      Head: Normocephalic and atraumatic.      Mouth/Throat:      Pharynx: Oropharynx is clear.   Eyes:      General: No scleral icterus.     Pupils: Pupils are equal, round, and reactive to light.   Neck:      Comments: No TM  Cardiovascular:      Rate and Rhythm: Normal rate and regular rhythm.      Pulses: Normal pulses.      Heart sounds: Normal heart sounds. No murmur heard.     No friction rub. No gallop.   Pulmonary:      Effort: Pulmonary effort is  normal. No respiratory distress.      Breath sounds: Normal breath sounds. No wheezing, rhonchi or rales.   Abdominal:      General: Bowel sounds are normal. There is no distension.      Palpations: Abdomen is soft.      Tenderness: There is no abdominal tenderness.   Musculoskeletal:         General: No swelling.      Cervical back: Normal range of motion and neck supple. No tenderness.   Lymphadenopathy:      Cervical: No cervical adenopathy.   Skin:     General: Skin is warm.      Findings: No erythema or rash.   Neurological:      Mental Status: She is alert and oriented to person, place, and time.   Psychiatric:         Mood and Affect: Mood normal.         Behavior: Behavior normal.              Assessment and Plan       ICD-10-CM ICD-9-CM   1. Essential hypertension  I10 401.9   2. Family history of diabetes mellitus (DM)  Z83.3 V18.0   3. Class 2 severe obesity with serious comorbidity and body mass index (BMI) of 39.0 to 39.9 in adult, unspecified obesity type  E66.01 278.01    Z68.39 V85.39   4. Insulin resistance  E88.819 277.7   5. PCOS (polycystic ovarian syndrome)  E28.2 256.4      Essential hypertension  Comments:  bp stable  Orders:  -     Ambulatory Referral/Consult to Lifestyle Nutrition; Future; Expected date: 05/21/2024    Family history of diabetes mellitus (DM)    Class 2 severe obesity with serious comorbidity and body mass index (BMI) of 39.0 to 39.9 in adult, unspecified obesity type  -     Ambulatory Referral/Consult to Lifestyle Nutrition; Future; Expected date: 05/21/2024  -     Ambulatory referral/consult to Endocrinology; Future; Expected date: 05/21/2024    Insulin resistance  -     Ambulatory Referral/Consult to Lifestyle Nutrition; Future; Expected date: 05/21/2024  -     Ambulatory referral/consult to Endocrinology; Future; Expected date: 05/21/2024    PCOS (polycystic ovarian syndrome)  -     Ambulatory referral/consult to Endocrinology; Future; Expected date:  05/21/2024      Reviewed with pt AOMs--  Felt fuzzy with topamax    Make/keep pcp appt  Continue lifestyle changes     Pt made aware I will be leaving [facility] in June. She has established pcp so have advised her f/u with them this summer.   Also reviewed potential external community options.    Follow up if symptoms worsen or fail to improve.

## 2024-07-11 ENCOUNTER — NUTRITION (OUTPATIENT)
Dept: PRIMARY CARE CLINIC | Facility: CLINIC | Age: 44
End: 2024-07-11
Payer: COMMERCIAL

## 2024-07-11 DIAGNOSIS — E66.01 CLASS 2 SEVERE OBESITY WITH SERIOUS COMORBIDITY AND BODY MASS INDEX (BMI) OF 39.0 TO 39.9 IN ADULT, UNSPECIFIED OBESITY TYPE: ICD-10-CM

## 2024-07-11 DIAGNOSIS — I10 ESSENTIAL HYPERTENSION: ICD-10-CM

## 2024-07-11 DIAGNOSIS — Z71.3 DIETARY COUNSELING AND SURVEILLANCE: Primary | ICD-10-CM

## 2024-07-11 PROCEDURE — 99999 PR PBB SHADOW E&M-EST. PATIENT-LVL I: CPT | Mod: PBBFAC,,, | Performed by: DIETITIAN, REGISTERED

## 2024-07-11 PROCEDURE — 97802 MEDICAL NUTRITION INDIV IN: CPT | Mod: S$GLB,,, | Performed by: DIETITIAN, REGISTERED

## 2024-07-11 NOTE — PATIENT INSTRUCTIONS
Name: Estefania Crenshaw  Date: 7/11/24  NUTRITION RECOMMENDATIONS    Action Items:  Healthy rate of weight loss to maintain weight off is 0.5-1.5 pounds per week  Physical activity recommendations:  At least 150 minutes per week  At least 2 days per week of strength training  Increase potassium containing foods to help offset sodium  Consistency is key over all else  Focus on having adequate protein throughout the day  At least 20 grams at breakfast  At least 30 grams at lunch/dinner  Aim for at least 10 grams for sancks  Fill your plate with fiber  Aim for at least 1-2 cups of produce (fruit + non-starchy vegetables) at most meals  Limit carbs/starchy veggies to no more than 1 cup total per meal  Fuel your body every 3-4 hours    High Potassium Foods:  Oranges, cantaloupe, honeydew, grapefruit, dates  Cooked spinach  Cooked broccoli  Potatoes / sweet potatoes  Peas  Cucumbers  Zucchni  Pumpkins  Leafy greens  Beans   Whole grains  Favorite Websites for Recipe Inspiration  Skinnytaste  Real Food Dietitians   Eating Well  Budget Bytes  Eating Bird Food  Fit Foodie Finds  Wholesome Yum   Oh Snap Macros   35 Macro Friendly Meal Prep Recipes   9 Meal Prep Success Tips     Additional Recommendations  Limit added sugars to <25g per day  Aim to get ½ your body weight in fluid + additional 20 ounces for each hour of activity  Exercise elayne and website recommendations:  Street Parking - typically <30 minutes and higher intensity  [employer]  - elayne  FitBod - weight lifting elayne, can adapt to any level / equipment    Moves  - elayne  [employer]  - elayne  YouTube:  [facility] [employer]   The Studio by Omari Iverson   Yoga with Cher Vera   Body Project   Move with [employer] I recommend following for tips and ideas:  Dietitian Savanah Nicholson Nutritionist  Carlton Qureshi, MAGDI De Jesus  Lean Protein Sources:  Chicken breast  Boneless, skinless chicken thighs  Eggs  or egg whites/beaters  Pork tenderloin  93/7 or 90/10 ground beef/turkey/chicken  Sirloin, flank, filet, eye of round steak  Center cut gutierrez  Pork chop, thin cut, boneless  Beef jerky  Deli meat (chicken, ham, turkey)  [place]  Catfish  Tuna, canned in water  Oysters   Crab, shrimp, crawfish  Low-fat dairy, any kind  30 grams of protein cheat sheet:  3.5 ounces cooked chicken breast // 172 calories  4 ounces ground turkey breast // 120 calories  5 ounces [place] strip steak // 325 calories  5 ounces shrimp // 170 calories  6 ounces cod // 145 calories  5 ounces tuna fish // 165 calories  5 ounces salmon // 300 calories  8 ounces extra firm tofu // 250 calories  1.5 scoops protein powder // 160 calories  1.5 cups edamame // 280 calories  2 cups black beans // 485 calories  5 eggs // 390 calories  1.25 cups egg whites or egg beaters // 155 calories  When looking for a lean protein look for protein > fat on the nutrition label if fat > protein it is considered a high fat meat and should be limited during times of weight loss  Non-Starchy Vegetables include all vegetables except:  Potatoes / sweet potatoes  Corn  Peas  Fischer beans  Winter squash such as acorn squash  High Fiber Carbohydrate Sources:  Beans / lentils  Whole grains  Potatoes with skin on   Quinoa  Rice  Peas  Corn  Fischer beans  Carb balance tortillas or flatout wraps  All fruits  Oatmeal   High Fiber Foods  Raspberries, 1 cup, 8 grams  Pear, 1 medium, 5.5 grams  Apple with skin, 1 medium, 4.5  grams   Green peas, 1 cup, 9 grams   Broccoli, 1 cup chopped, 5 grams   [employer] sprouts, 1 cup, 4 grams   Whole wheat pasta, 1 cup 6 grams   Quinoa cooked, 1 cup 5 grams   Popcorn, plain 3 cups, 3.5 grams   Lentils, 1 cup. 15.5 grams   Emeka seeds, 1 tablespoons ,3 grams   Almonds, 1 ounce, 3.5 grams   Black beans, 1/2 cup, 7.5 grams   Renuka Ted Delightful bread, 2 slices, 5 grams   Carb balance tortillas, 1 soft taco sized, 15 grams   Frozen Meal Guidelines:  <400  calories  20g or greater protein  Top with rotisserie chicken for extra protein  Pair with additional fiber on the side to make it filling  Piece of fruit, ½ a salad kit, bag of frozen vegetables  Estimated Nutrition Needs  Calories: 1600 to 1700  Protein: 100 to 130 grams per day  Fiber: At least 25 grams per day    Breakfast Ideas  300-500 calories, at least 20g protein    Idea #1:  408 calories, 32g protein, 34g carbs, 16g fat  ½ cup egg whites/beaters scrambled  2 Veggies Made Great cinnamon roll muffins  3 turkey or chicken breakfast sausage links  1 cup fresh strawberries  Idea #2:   322 calories, 30g protein, 41g carbs, 7g fat  Yogurt Parfait  2/3 - 1 cup Oikos Triple Zero Greek yogurt, any flavor  1 cup mixed berries  ½ cup cereal  1 tbsp neisha seeds  Idea #3:   436 calories, 38g protein, 44g carbohydrate, 12g fat  ½ cup egg whites/beaters scrambled  2 [place] cake buttermilk & vanilla waffles  ½ cup low sugar vanilla Greek yogurt  ½ cup fresh blueberries  2 tbsp sugar-free syrup  Idea #4:  High Protein Oatmeal  High Protein Overnight Oats    Nutrition for plain recipe per servin calories, 38g protein, 40g carbohydrate, 9g fat  Customizable High Protein Baked Oatmeal  Nutrition for plain recipe per servin kcal, 20g protein, 38g carbs, 9g fat  Idea #5:  300 calories, 21g protein, 18g carbohydrate, 16g fat  Meal Prep Breakfast Burritos    Idea #6:  345 calories, 42g protein, 24g carbohydrate, 9g fat  Basic Protein Smoothie  1 scoop protein powder  1 cup frozen fruit  ½ cup frozen cauliflower rice  8 ounces Fairlife 2% milk or unsweetened soy milk  1 tablespoon neisha seeds  Optional add-ins: peanut butter or PB2, ground flax seed, coconut flakes, sugar-free pudding mix, handful baby spinach, ½ cup pickled beets    Idea #7:  380 calories, 23g protein, 41g carbs, 15g fat  Breakfast Logan  2 slices white bread  2 eggs  1 slice 2% American cheese  1 cup cantaloupe    Idea #8:  320 calories, 23g protein,  39g carbs, 12g fat  1 cup Ratio cereal + 1 cup Fairlife 2% milk + ½ cup frozen blueberries    Idea #9:  446 calories, 19g protein, 42g carbs, 24g fat  2 eggs cooked with spray olive oil  2 slices white bread  1 portion container smashed avocado  ½ cup chopped pineapple  Idea #10:  475 calories, 28g protein, 34g carbs, 26g fat  Omelet:  1 egg + ¼ cup egg whites  Unlimited non-starchy vegetables: spinach, bell peppers, onions, mushrooms, etc.  2 slices [place] [place] Cut Delgado   2 tbsp reduced-fat shredded cheese  2/3 cup Simply Potatoes Shredded Hashbrowns made in the air fryer  1 tbsp ketchup   1 cup chopped cantaloupe    Lunch Ideas  400 - 500 calories  At least 30g protein    Idea #1:  Varies  Building A Better-For-You Salad   Start with a base of fresh greens: arugula, spinach, butter lettuce, mixed greens, kale, loco  Add at least 2-3 veggies  Raw veggies add a nice crunch  Grilled or roasted veggies add a nice charred, caramelized flavor  Pickled veggies are great for adding a hint of sweet/sour flavor, while fermented veggies give you added probiotic benefits  Add a lean protein (3-4 ounces)  Grilled chicken, turkey, tuna/salmon packet, shrimp, tofu, steak  Add high fiber carbs (~ ½ cup)  Beans & quinoa   Grains: whole grain, chickpea pasta, farro, bulgar  Starchy veggies: potatoes, winter squash, corn  Fruit: berries, grapes, chopped apples, etc.  Add texture + flavor: choose 1-2, up to ~ 2 tablespoons of each item  Avocado  Cheese  Hummus   Nuts   seeds  Dress it up  Choose light/low-fat options: Sibley's Own Light dressings and Shippable Farms are great options  Make your own: oil + vinegar + seasonings + a little bit of Hang mustard  Homemade Salad Dressing Recipes  Try a salad kit (<400 calories including dressing) and top with a small can or packet tuna/salmon or 3-4 ounces rotisserie chicken  Healthy Salad Recipes    Idea #2:  [place] only: 388 calories, 37g protein, 24g carbohydrate, 16g fat  Build  a better sandwich:   2 slices bread of choice (<80 calories per slice)   3-4 ounces deli meat  1 sliced reduced-fat cheese or ¼ sliced avocado or [place] Ultra-Thin Sliced cheese  unlimited non-starchy vegetables such as lettuce, tomato, onion, pickle, etc.  1 tablespoons sauce (gale, Ranch, honey mustard, BBQ sauce, hummus), unlimited yellow or brown mustard  Side items (choose one):   1 cup of raw non-starchy vegetables (baby carrots, sugar snap peas, cucumbers, etc.)   ½ cup cooked non-starchy vegetables  1 cup fruit or 1 small piece  1 bag of baked chips (<120 calories for portion/bag)  Idea #3:  Snack Box Lunch  Option 1:  382 calories, 34g protein, 30g carbohydrate, 14g fat  2 boiled eggs  2 ounces deli meat  ½ bell pepper  11 mini pretzels  ½ cup grapes  Light string cheese  Option 2:   386 calories, 27g protein, 47g carbohydrate, 10g fat  1 serving Nut Thins  Light string cheese  Tuna pack, any flavor  1 cup chopped strawberries  Fiber one brownie bar  ½ cup blueberries  Option 3:  403 calories, 32g protein, 44g carbohydrate, 11g fat  1 ounce pretzel crisps  ½ cup apple sliced  2 slices ultra-thin provolone cheese  2 ounces rotisserie chicken  4 tbsp powdered peanut butter mixed with water (see directions)  ½ cup blackberries  Option 4:   446 calories, 36g protein, 26g carbohydrate, 22g fat  2 tbsp hummus  3 ounces baby carrots  2 hard boiled eggs  ½ cup grapes  2 ounces deli turkey wrapped around 2 low fat string cheese  Idea #4  400 calories, 27g protein, 54g carbs, 9g fat  Tuna Salad  1 5-ounce can chunk light tuna in water  2 tbsp. Fage 0% Greek Yogurt or olive oil gale  Chopped celery & onion  Salt, pepper, lemon juice to taste  Serve with 11 Triscuit Thin Crisps - any flavor variety or any other whole grain or nut based cracker  1 medium apple + 2 tbsp brownie batter hummus    Idea #5:  433 calories, 37g protein, 41g carbs, 19g fat  Chicken Caesar Wrap  3 ounces rotisserie chicken  2 tbsp [place]  Creamy Caesar dressing  2 tbsp parmesan cheese  1 cup chopped loco  1-2  [place] Carb Balance Sundried Tomato Basil tortillas  1 serving Popcorners Chips    Dinner Ideas  400 - 500 calories  At least 30g protein    Idea #1:  Varies: ~ 400-500 calories, 31-49g protein, 35g carbohydrate, 10g fat  3-5 ounces of baked, grilled, air fried, or broiled fish, seafood, or lean meat cooked with spray olive oil  1 cup or more of cooked non-starchy vegetables  Up to 1 cup carbohydrate of choice  Idea #2:  Varies: made with 1 cup quinoa, 4 ounce sirloin steak, 1 cup chopped broccoli, and 1 tablespoon Lemon Basil Vinaigrette 500 calories, 46g protein, 42g carbohydrate, 16g fat  Build a grain/power bowl:   Choose a starch (Up to 1 cup feel free to mix/match)  Brown rice, farro, quinoa, millet, barley, couscous  White or sweet potatoes  [place] squash  Choose a protein (3-5 ounces)  [place], steak (lean cut), chicken, shrimp, edamame, tofu  Vegetables (unlimited amounts)  Try them roasted, steamed, or grilled for variety  Eggplant, broccoli, carrots, bell peppers, green beans, radishes, cucumbers, parsnips, lettuce, cabbage  Dressing/sauce  Vinaigrette, yogurt, warm broth, hot sauce, soy sauce, chimichurri, pesto, or any combinations!  Garnish (optional)  Fresh herbs, micro greens, nuts, seeds, toasted coconut  Note that nuts/seeds/coconut contain a lot of calories and should be used sparingly  Idea #3:  433 calories, 42g protein, 40g carbohydrate, 25g fat  Tacos  1-2 carb balance tortillas  ¼ cup cheese of choice  2 tbsp sour cream  3-4 ounces lean (93/7) ground beef or turkey or rotisserie chicken  Idea #4:  412 calories, 35g protein, 41g carbohydrate, 12g fat  Better-for-you pizza  2-Ingredient High Protein Dough,  ¼ cup red sauce  1/3 cup cheese   2-4 ounces of protein (rotisserie chicken, turkey pepperoni, sliced grilled chicken/steak)  veggies of choice  Serve with at least 1 cup of non-starchy vegetables or side salad  27  Ways to Use 2-Ingredient Dough   Idea #5:  Varies, made with chicken sausage and marinara and steamed broccoli  433 calories, 35g protein, 44g carbohydrate, 13g fat  Better-for-you pasta  1 cup cooked Banza/Whole Wheat/Barilla Protein+ pasta  2-4 ounces lean protein  ¼ cup peace sauce or ½ cup red sauce or 1-2 tablespoons pesto   Serve with 1-2 cups non-starchy vegetables  Idea #6:  450 calories, 36g protein, 54g carbs, 12g fat  2 tbsp. G. Falcon Sugar Free Stir Raman Sauce  ½ cup cauliflower rice mixed with ½ cup white rice  3 ounce rotisserie chicken  1 cup chopped broccoli  2 Cream Cheese Rangoons (no sauce)    Snack Ideas  Around 200 calories, needed if going >4 hours without fueling  Choose one of the following:  ½ cup Good Culture Low-Fat cottage cheese + ¼ cup freeze dried blueberries  1 container (5.3 ounces) low-sugar Greek yogurt + ¼ cup low-sugar cereal  2 light mozzarella cheese sticks + 1 individual bag of Pop Chips  ¼ cup or individual portion bag of nuts + 1 serving Bare Apple Chips   5 whole grain crackers + 2 Light Babybel Cheese + 1 small bell pepper  1 medium apple + 1 tbsp peanut butter  Turkey roll up: 1 Carb Balance tortilla + 2-3 slices turkey deli meat + 1 tbsp Olive oil gale  Snack Pack Edamame   1 cup grapes + 2 light mozzarella cheese sticks  1-2 Chomps sticks + 1 piece of fruit   Protein bar:  <150 calories and >10g protein  Protein Shake:  At least 20g protein  <170 calories  2 slices deli meat wrapped around 2 low-fat cheese sticks + 1 cup chopped fruit    Sweet Treats  Okay to build in 3-4x per week  Protein shake  ½ cup berries + a dollop of whipped cream  Outshine Frozen Fruit Bars (2-3 if wanted)  Chocolate Dipped Banana Bites  Raspberry Lemon Greek Frozen Yogurt Bark  1 cup strawberries with 2-3 tbsp chocolate hummus  Sugar Free Pudding or Jello  5.3 ounce 0% Fage yogurt mixed with 2 tablespoons sugar-free pudding mix  All the Best Banana Nice Cream Recipes   Any sweet treat under  200 calories (think mini/fun-sized)    Additional Recipe Ideas:  Breakfast  Oats Overnight + 6 ounces Fairlife 2% milk   Meal Prep Breakfast Options:  [place] Chicken Egg Cups  Ham & Broccoli Breakfast Casserole  Bagel Ham and Cheese Quiche  Blueberry Protein Pancakes  English Muffin Breakfast Bake     Raspberry Fiber Smoothie  1 cup frozen raspberries  1 cup unsweetened almond milk  2/3 cup nonfat vanilla Greek yogurt (or 5.3 oz cup) or 1 scoop protein powder  ½ cup frozen cauliflower (florets or riced)  1 tbsp powdered peanut butter  1 tbsp neisha seeds  PB&J Smoothie Bowl  ¼ cup milk of your choice  2/3 cup frozen blueberries  2/3 cup sliced strawberries, frozen  1 scoop vanilla protein powder  1 tbsp powdered peanut butter  Optional toppings: 1 tbsp rehydrated powdered peanut butter, 1-2 tbsp granola, neisha seeds, blueberries  Spinach Avocado Smoothie  1 cup nonfat plain Greek yogurt  1 cup fresh spinach  1 frozen banana  ¼ avocado  2 tbsp water  1 tsp honey  Lunch/Dinner  Chicken Enchiladas - great for freezing serving with a side salad or at least 1 cup non-starchy vegetables  Ground Turkey Skillet with Zucchini, Corn, Black Beans, and Tomato great by itself or serve over 1 cup cauliflower rice or 1/3 cup brown rice    Air Fryer Chicken Thighs with Air Fryer Frozen Broccoli & 1 cup cubed roasted potatoes  BBQ Grilled chicken breast with Brown Sugar Baked Beans + 1 cup non-starchy vegetables   Juicy Chicken Breast 6 Ways  30 Sheet Pan Dinners    Chicken Fajita Kabobs serve with brown rice, quinoa, or carb balance tortillas + additional non-starchy vegetables  Pasta Primavera add in additional protein such as shrimp, sliced steak, ground beef, or rotisserie chicken    Sheet Pan Turkey Meatloaf + Broccoli serve with 1 whole baked potato and ¼ cup reduced fat cheese  Ranch Chicken Meal Prep  Calorie Swaps  Sweet Baby Rays BBQ sauce --> Sweet Baby Rays Zero Sugar BBQ Sauce  Greek Gods Honey Vanilla Greek Yogurt -->  Oikos Triple Zero Vanilla Greek Yogurt  Ice cream [place] --> Skinny Cow No Sugar Added Ice Cream Bar  Butter --> [place] Butter with Light Butter  Rice --> 50/50 mixture of rice & cauliflower rice  Cream Cheese --> Whipped Cream Cheese  Sliced cheese --> ultra-thin sliced cheese  [place]illip/Sour Cream --> Fage 0% Greek Yogurt  Syrup --> Lite version  Strawberry jam --> sugar free preserves  Tortillas --> corn tortillas or carb balance tortillas  Ranch --> [place] Ranch  Vinaigrettes --> Jaren's Red Wine Vinaigrette Dressing and Olive Garden Lite Italian  Coffee syrups --> Sugar free options  Pasta --> Spaghetti Squash  Phillip --> olive oil phillip  Avocado --> portion control smashed avocado cups  Restaurant Tips   Pick 1 out of the 4 Rule: Instead of eating bread/tortilla chips, an appetizer, alcoholic drink and dessert, choose just one to have with your entrée   Focus on lean proteins: Refer to lean meat/meat substitutes page in meal planning guidebook. Select items grilled, baked, broiled, braised, poached or roasted   For your heart health, avoid crispy, crunchy, breaded, paneed or stuffed items and items that are cream based, au gratin or buttered   Order sauces, dressings, and gravies on the side. This way you can add 1-2 tablespoons yourself. This helps with portion control    Request extra non-starchy vegetables instead of a starchy side dish. If the starchy side is something you love, consider splitting it with someone else at the table   Beverages: Order water with lemon, sparkling water, or unsweetened tea. Avoid sugary soft drinks, juices and mixers   Deep fried foods: Enjoy no more than 2x/month    Favorite Brands  Protein Bars/Shakes/Powders  Bars  RX Bars  Fit Crunch  THINK  Quest  One  Shakes  Core Power Shakes (26g or 42g protein)  SeekSherpa Nutrition Plan (30g protein)   Ensure Max Protein (30g protein)  Premier Protein Shakes (30g protein)  Boost High Protein (20g protein)   Muscle Milk  Genuine Protein Shakes (25g protein)   Quest Protein Shakes (30g protein)   Orgain Protein Shakes (20-26g protein)   Powders  Ghost  Optimum Nutrition   Muscle Milk  Garden of Life  Toth   Naked   Pasta/Rice  Banza Chickpea Pasta  Barilla Whole Wheat  Parish Rice  Success Boil in Bag Brown Rice  Bread  Antonino's Killer Bread thin sliced  Nature's Own 100% Whole Grain Sugar-Free  Orowheat Whole Wheat Small Slice  Renuka Ted Delightful White Whole Wheat  [place] Carb Balance Tortillas  Ole Extreme Wellness Tortillas  Flatout Wraps  [place]ander[place] Carb Counter Tortillas  Toufayan Keto Wraps  Dairy  Chobani less sugar  Dannon Light and Fit Yogurt  Fage 2% plain  Oikos Triple Zero  Fairlife 2% Milk  Ripple Milk, unsweetened(milk alternative)  Soy Milk, Vanilla Unsweetened  Good Culture Low-Fat Cottage Cheese  Dressings/Sauces  [place] Greek Yogurt Ranch   Sweet Baby Ray's BBQ Sauce - no added sugar  Any ketchup with no added/less added sugar  Felecia or Tulio or Primal Kitchen  G Falcon Sugar Free Sauces   [place]'s Own Dressings   Jaren's Dressings   Cereal  Kashi GO!  Ratio  Shredded Wheat  Fiber One  Grape Nuts  Special K Protein  Snacks  Nature's Garden Trail Mix Snack Packs  Pop Chips (like healthier Lays)  Pop Corners (like healthier Doritos)  Skinny Pop Popcorn  Lesser Evil Popcorn  Beanitos Chips  Druze Oats Rice Crisps   Late July Emeka & Quinoa Tortilla Chips  Great Value Zero Sugar Beef Jerky (be mindful of sodium)  Chomps Meat Sticks    K.I.S.S. (Keep It Simple Silly)  Pick 2-3 non-starchy vegetables  Rotate during the week having them for lunches and dinners.  Pick 1-2 different types of meats  Use different sauces   BBQ  Schleicher  Teriyaki  Pesto  Salsa  Seasonings  Slap Ya Mama  Lemon Pepper  Everything but the Valentin Gutierrez's Chipotle Cherry  Pick 2 different starches to have during the week  For example:  Potatoes  sweet potatoes  Corn  butternut squash  Banza chickpea pasta  brown rice  Keep breakfast simple,  have two options and stick with those two options the whole week  For example, making Overnight Oats with a serving of fruit for 3 breakfasts and eat 2 Healthy Egg Muffin Cups with a slice of whole grain toast and 1 serving fruit for the other 4 breakfasts    For lunch/dinner you could do something simple like the following: This would allow you to not waste a lot of ingredients but have a wide variety of flavors that you could mix match all week.  Chicken thighs 3 ways:   BBQ Sauce  Lemon Pepper Seasoning  Ranch Seasoning  Potatoes 2 ways:   cubed and roasted  mashed with low-fat milk, Greek yogurt (instead of butter or sour cream) and low-fat cheese  Brown rice - made in chicken stock, low sodium (gives it extra flavor)  Frozen Broccoli with ranch seasoning, microwaved  Frozen broccoli roasted, sprinkled with lemon juice and 1 tbsp parmesan  Green beans with sliced mushrooms  Green beans sauteed with garlic  15-Minute Meals  Two slices whole wheat bread + frozen turkey burger + microwave broccoli  Chickpea pasta + jar tomato sauce + chicken sausage + side salad kit  Microwave rice + rotisserie chicken + frozen veggie mix + teriyaki sauce + chopped peanuts  [place] grilled chicken rotisserie or blackened grilled chicken tenders + frozen garlic bread + baby carrots + hummus  Stir Raman Chicken - make with Banza pasta + add in double vegetables   Shrimp + BBQ sauce + canned corn + canned beans + microwave vegetables  Can tuna/salmon/chicken + 2 tablespoons Greek yogurt/avocado oil gale + 1 serving Nut Thins + apple + cheese stick

## 2024-07-11 NOTE — PROGRESS NOTES
"Nutrition Assessment    Visit Type: Insurance initial  Session Time:  1 Hour 30 Minutes  Reason for MNT visit: Pt in for education and nutrition counseling regarding HTN and Obesity.     Age: 43 y.o.  Wt:   Wt Readings from Last 10 Encounters:   05/14/24 105 kg (231 lb 7.7 oz)   02/24/24 104.9 kg (231 lb 2.4 oz)   12/15/23 106 kg (233 lb 11 oz)   11/06/23 106.1 kg (234 lb)   10/31/22 96.6 kg (213 lb)   10/25/21 104.8 kg (231 lb)     Ht:   Ht Readings from Last 1 Encounters:   05/14/24 5' 4" (1.626 m)     BMI:   BMI Readings from Last 5 Encounters:   05/14/24 39.73 kg/m²   02/24/24 39.68 kg/m²   12/15/23 40.11 kg/m²   11/06/23 40.17 kg/m²   10/31/22 36.56 kg/m²       Client states:  Previously did keto diet and lost about 30 pounds. Did respond well with lower carb, but was not sustainable. Found she craved rice a lot with lower carb. Really wants to lose weight quickly. Encouraged a slow and steady approach so that she can maintain the weight off. Recommended practicing how you want to maintain the weight off now so that you do not have to figure it out when you reach goal weight. Has recently started at a new job where she works from home, but it is much more demanding than previous position. Often goes long stretches without eating. Does not currently exercise, states she knows she needs to but does not enjoy any form of physical activity. Has previously done [employer], but did not work for herself and . Often felt  like portions were not filling. Does not accept whole wheat products and does not enjoy cooking.   Discussed:  1700 kcal goal  Protein goal of 130 grams  0.5-1.5 lbs per week weight loss  Fiber goal of 30 grams per day  Physical activity recommendations  Tracking versus not trakcing  Consistency  Practicing how we want to maintian weight off  Strength vs cardio training  Eating frequently  Increasing potassium foods    Medical History  Problem List             Resolved    Essential " hypertension         Migraine syndrome         Recurrent major depressive disorder, in partial remission         Class 2 severe obesity with serious comorbidity and body mass index (BMI) of 39.0 to 39.9 in adult         Family history of diabetes mellitus (DM)        Labs   Reviewed and noted  Medications    Prior to Admission medications    Medication Sig Start Date End Date Taking? Authorizing Provider   acyclovir (ZOVIRAX) 400 MG tablet Take 1 tablet (400 mg total) by mouth 3 (three) times daily. for 10 days 2/24/24 3/5/24  Mary Burger PA-C   buPROPion (WELLBUTRIN XL) 300 MG 24 hr tablet Take 300 mg by mouth every morning. 9/17/23   Provider, Historical   ketorolac (TORADOL) 10 mg tablet TAKE 1 TABLET BY MOUTH EVERY 6 HOURS FOR UP TO 5 DAYS AS NEEDED FOR PAIN 6/28/22   Provider, Historical   losartan (COZAAR) 50 MG tablet Take 50 mg by mouth once daily. 9/30/22   Provider, Historical   magnesium oxide (MAG-OX) 400 mg (241.3 mg magnesium) tablet Take 1 tablet by mouth every morning.  Patient not taking: Reported on 5/14/2024    Provider, Historical   magnesium oxide 500 mg Cap magnesium oxide 500 mg oral capsule take 1 capsule by oral route daily   Active    Provider, Historical   naratriptan (AMERGE) 2.5 MG tablet Take by mouth.    Provider, Historical   norelgestromin-ethinyl estradiol (XULANE) 150-35 mcg/24 hr Place 1 patch onto the skin once a week. 11/6/23 11/5/24  Andrey Yoo MD   onabotulinumtoxina (BOTOX) 200 unit SolR INJECT 155 UNITS  INTRAMUSCULARLY EVERY 12  WEEKS (GIVEN AT PRESCRIBERS OFFICE, DISCARD UNUSED  AFTER 1ST USE) 10/22/20   Provider, Historical   rizatriptan (MAXALT) 10 MG tablet  10/15/21   Provider, Historical      Vitamins, Minerals, and/or Supplements:  Magnesium oxide (takes sometimes)      LIFESTYLE FACTORS  Social History    Marital status:    Smoking: Never    Sleep: fair  Wake: 730 am   Stress Level: high  Support System:  spouse  Physical Activity: Sedentary  (little or no exercise)  Types of activity: none  Dining out: 3-4 x per week  Types of restaurants/foods: fast food if running errands ([employer], Popeyes, McDonalds) // [employer]., Chinese, Burger  Frequency of consumption of fried foods: weekly  Meal preparation/shopping: dislikes cooking // self // [employer], [employer]   Food Allergies or Intolerances:  NKFA // Sensitive to pork products, processed meats, wine, chocolate, may trigger migraines      Beverages  Water: trying to drink more water // MATTEO // didn't love sparkling water   Alcohol: None  Coffee: none  Energy Drinks: none  Tea: occasionally (hot) // if out might have sweet tea  Soda: Diet Pepsi prefers 3-4x per day // Coke Zero     Diet Recall  Breakfast: 10 am toast white (peanut butter) // may meal prep egg sandwich with gale  AM Snack: if high stress will grab snack, pretzels, chips, popcorn  Lunch: 2 pm left overs // sandwich, white bread, turkey, gale sometimes cheese or pb&J  PM Snack may graze  Dinner: 6-8 pm whatever can find in fridge // biscuits & gravy //   Cravings: high sugar cravings    Diagnosis    Inadequate protein intake related to small portions of protein containing foods as evidenced by diet recall and patient report of not eating protein throughout the day.    Intervention    Estimated Energy Requirements:   BMR x 1.2 = 2052 - 400  Adj. BW = 75 kg  Calories: 1600  Protein: 1.5-2.0 g/kg 120 grams   Baseline for fluids: 1mL/kcal 54 ounces     Written Materials Provided  Simple Meal Planning, Healthy Plate, Fueling Well on the Go Guide, and Getting Started with Exercise  RD contact information    Recommendations:  Healthy rate of weight loss to maintain weight off is 0.5-1.5 pounds per week  Physical activity recommendations:  At least 150 minutes per week  At least 2 days per week of strength training  Increase potassium containing foods to help offset sodium  Consistency is key over all else  Keep changes for a minimum of 4-6 weeks before deciding  something is not working for you  Focus on having adequate protein throughout the day  At least 20 grams at breakfast  At least 30 grams at lunch/dinner  Aim for at least 10 grams for sancks  Fill your plate with fiber  Aim for at least 1-2 cups of produce (fruit + non-starchy vegetables) at most meals  Limit carbs/starchy veggies to no more than 1 cup total per meal  Fuel your body every 3-4 hours      Monitoring/Evaluation    Monitor the following:  Weight  Sleep  Movement    Communicated with healthcare provider and documented plan for referral to appropriate agency/healthcare provider as needed    Supervising Physician: Phil German MD    Patient motivation, anticipated barriers, expected compliance: Patient is motivated and has verbalized understanding and intent to comply.     Comprehension: fair     Follow-up: 6 weeks

## 2024-08-22 ENCOUNTER — NURSE TRIAGE (OUTPATIENT)
Dept: ADMINISTRATIVE | Facility: CLINIC | Age: 44
End: 2024-08-22
Payer: COMMERCIAL

## 2024-08-22 NOTE — TELEPHONE ENCOUNTER
Patient's  calling to ask about symptoms of long covid. Patient was able to get on phone to complete triage process. Dispo provided-be seen in office today. Patient has a non [facility] MD. Advised patient reach out to provider for appointment or go to ER/UC. Instructed to call back with additional questions or worsening of symptoms. Patient verbalized understanding.     Reason for Disposition   PERSISTING SYMPTOMS OF COVID-19 and symptoms WORSE    Additional Information   Negative: SEVERE difficulty breathing (e.g., struggling for each breath, speaks in single words)   Negative: SEVERE weakness (e.g., can't stand or can barely walk) and new-onset or WORSE   Negative: Difficult to awaken or acting confused (e.g., disoriented, slurred speech)   Negative: Bluish (or gray) lips or face now   Negative: Sounds like a life-threatening emergency to the triager   Negative: MODERATE difficulty breathing (e.g., speaks in phrases, SOB even at rest, pulse 100-120) and new-onset or WORSE   Negative: MODERATE difficulty breathing and oxygen level (e.g., pulse oximetry) 91 to 94%   Negative: Oxygen level (e.g., pulse oximetry) 90% or lower   Negative: MODERATE difficulty breathing (e.g., speaks in phrases, SOB even at rest, pulse 100-120)   Negative: Drinking very little and dehydration suspected (e.g., no urine > 12 hours, very dry mouth, very lightheaded)   Negative: Patient sounds very sick or weak to the triager   Negative: MILD difficulty breathing (e.g., minimal/no SOB at rest, SOB with walking, pulse <100) and new-onset   Negative: Oxygen level (e.g., pulse oximetry) 91 to 94%   Negative: PERSISTING SYMPTOMS OF COVID-19 and NEW symptom and could be serious   Negative: Caller has URGENT question and triager unable to answer question    Protocols used: Coronavirus (COVID-19) Persisting Symptoms Follow-up Call-[facility]

## 2024-09-26 ENCOUNTER — OFFICE VISIT (OUTPATIENT)
Dept: URGENT CARE | Facility: CLINIC | Age: 44
End: 2024-09-26
Payer: COMMERCIAL

## 2024-09-26 VITALS
HEIGHT: 64 IN | RESPIRATION RATE: 16 BRPM | DIASTOLIC BLOOD PRESSURE: 86 MMHG | TEMPERATURE: 99 F | HEART RATE: 91 BPM | SYSTOLIC BLOOD PRESSURE: 153 MMHG | OXYGEN SATURATION: 97 % | WEIGHT: 233.25 LBS | BODY MASS INDEX: 39.82 KG/M2

## 2024-09-26 DIAGNOSIS — I10 ELEVATED BLOOD PRESSURE READING IN OFFICE WITH DIAGNOSIS OF HYPERTENSION: ICD-10-CM

## 2024-09-26 DIAGNOSIS — J02.9 SORE THROAT: Primary | ICD-10-CM

## 2024-09-26 DIAGNOSIS — R09.89 RUNNY NOSE: ICD-10-CM

## 2024-09-26 DIAGNOSIS — J06.9 UPPER RESPIRATORY TRACT INFECTION, UNSPECIFIED TYPE: ICD-10-CM

## 2024-09-26 DIAGNOSIS — R05.9 COUGH, UNSPECIFIED TYPE: ICD-10-CM

## 2024-09-26 DIAGNOSIS — R09.82 POSTNASAL DRIP: ICD-10-CM

## 2024-09-26 LAB
CTP QC/QA: YES
CTP QC/QA: YES
MOLECULAR STREP A: NEGATIVE
SARS-COV-2 AG RESP QL IA.RAPID: NEGATIVE

## 2024-09-26 RX ORDER — BENZONATATE 200 MG/1
200 CAPSULE ORAL 3 TIMES DAILY PRN
Qty: 25 CAPSULE | Refills: 0 | Status: SHIPPED | OUTPATIENT
Start: 2024-09-26 | End: 2024-10-06

## 2024-09-26 RX ORDER — CHLORPHENIRAMINE MALEATE AND DEXTROMETHORPHAN HYDROBROMIDE 4; 30 MG/1; MG/1
1 TABLET, FILM COATED ORAL
Qty: 24 EACH | Refills: 0 | Status: SHIPPED | OUTPATIENT
Start: 2024-09-26 | End: 2024-10-06

## 2024-09-26 NOTE — PROGRESS NOTES
"Subjective:      Patient ID: Estefania Crenshaw is a 43 y.o. female.    Vitals:  height is 5' 4" (1.626 m) and weight is 105.8 kg (233 lb 4 oz). Her tympanic temperature is 98.6 °F (37 °C). Her blood pressure is 153/86 (abnormal) and her pulse is 91. Her respiration is 16 and oxygen saturation is 97%.     Chief Complaint: Sore Throat    Patient is a 41-year-old female who presents for evaluation of runny nose, postnasal drip, sore throat and headache.  Onset 3 days ago.  Treating with NyQuil and cough drops.  Denies dyspnea, wheezing, nausea, vomiting, diarrhea, rash.  No recent travel or sick contacts reported.  No other concerns voiced.    Sore Throat   This is a new problem. The current episode started in the past 7 days. The problem has been unchanged. There has been no fever. The pain is mild. Associated symptoms include congestion, coughing, headaches and swollen glands. Pertinent negatives include no diarrhea, shortness of breath, stridor or vomiting. She has tried acetaminophen (cough syrup) for the symptoms. The treatment provided no relief.       Constitution: Negative for chills and fever.   HENT:  Positive for congestion, postnasal drip and sore throat.    Respiratory:  Positive for cough. Negative for sputum production, shortness of breath, stridor and wheezing.    Gastrointestinal:  Negative for vomiting and diarrhea.   Skin:  Negative for rash.   Neurological:  Positive for headaches.      Objective:     Physical Exam   Constitutional: She is oriented to person, place, and time. She appears well-developed. She is cooperative.   HENT:   Head: Normocephalic and atraumatic.   Ears:   Right Ear: Hearing and external ear normal.   Left Ear: Hearing and external ear normal.   Nose: Rhinorrhea and congestion present. No mucosal edema or nasal deformity. No epistaxis. Right sinus exhibits no maxillary sinus tenderness and no frontal sinus tenderness. Left sinus exhibits no maxillary sinus tenderness and no frontal " sinus tenderness.   Mouth/Throat: Uvula is midline and mucous membranes are normal. Mucous membranes are moist. No trismus in the jaw. Normal dentition. No uvula swelling. Posterior oropharyngeal erythema present. No oropharyngeal exudate. Oropharynx is clear.   Eyes: Conjunctivae and lids are normal.   Neck: Trachea normal and phonation normal. Neck supple.   Cardiovascular: Normal rate, regular rhythm, normal heart sounds and normal pulses.   Pulmonary/Chest: Effort normal and breath sounds normal. No respiratory distress. She has no wheezes. She has no rhonchi. She has no rales.   Abdominal: Normal appearance and bowel sounds are normal. Soft.   Musculoskeletal: Normal range of motion.         General: Normal range of motion.   Neurological: She is alert and oriented to person, place, and time. She exhibits normal muscle tone.   Skin: Skin is warm, dry and intact.   Psychiatric: Her speech is normal and behavior is normal. Judgment and thought content normal.   Nursing note and vitals reviewed.      Assessment:     1. Sore throat    2. Elevated blood pressure reading in office with diagnosis of hypertension    3. Runny nose    4. Postnasal drip    5. Upper respiratory tract infection, unspecified type    6. Cough, unspecified type        Plan:       Sore throat  -     POCT Strep A, Molecular  -     SARS Coronavirus 2 Antigen, POCT Manual Read    Elevated blood pressure reading in office with diagnosis of hypertension    Runny nose    Postnasal drip    Upper respiratory tract infection, unspecified type    Cough, unspecified type    Other orders  -     benzonatate (TESSALON) 200 MG capsule; Take 1 capsule (200 mg total) by mouth 3 (three) times daily as needed for Cough.  Dispense: 25 capsule; Refill: 0  -     chlorpheniramine-dextromethorp (CORICIDIN HBP COUGH AND COLD) 4-30 mg Tab; Take 1 tablet by mouth every 6 (six) hours while awake. for 10 days  Dispense: 24 each; Refill: 0          Medical Decision Making:    Initial Assessment:   Nontoxic appearing 44 yo female c/o cough/congestion.  After complete evaluation, including thorough history and physical exam, the patient's symptoms are most likely due to viral upper respiratory infection. There are no concerning features on physical exam to suggest bacterial otitis media/externa, sinusitis, strep pharyngitis, or peritonsillar abscess. Vital signs do not suggest sepsis. Lung sounds are clear and not consistent with pneumonia. There is no neck pain or limited ROM to suggest retropharyngeal abscess or meningitis. In clinic testing for covid/strep is negative.The patient will be treated with supportive care. Will provide RX for tessalon  upon D/C. Follow up instructions and ED precautions provided.     Clinical Tests:   Lab Tests: Reviewed       <> Summary of Lab: Covid negative   Strep negative  Patient is seen in clinic with known history of essential hypertension noted to be elevated above goal today in clinic.  Patient was counseled that blood pressure was elevated. I advised adherence to current medication regime, low-salt heart smart diet, exercise and self-monitoring.  Patient follow up with primary physician for long-term management adjustments as needed.         Results for orders placed or performed in visit on 09/26/24   POCT Strep A, Molecular   Result Value Ref Range    Molecular Strep A, POC Negative Negative     Acceptable Yes    SARS Coronavirus 2 Antigen, POCT Manual Read   Result Value Ref Range    SARS Coronavirus 2 Antigen Negative Negative     Acceptable Yes      Patient Instructions   A cold is caused by a virus that can settle in your nose, throat or lungs. This causesa runny or stuffy nose and sneezing. You may also have a sore throat, cough, headache, fever and muscle aches. Different cold viruses last different lengthsof time, but the average time is 2 to 14 days.    Seek immediate medical care if you develop fever,  chest pain, or shortness of breath.     Treatment: There is no cure for the common cold, there is only symptomatic care.     Antibiotics may be used to treat signs of a secondary infection, but they do not treat  the cold virus. Try these tips to keep yourself comfortable:                   -Get plenty of rest.                   -Drink plenty of fluids, at least 8 large glasses of fluid a day. Good fluid choices are water, fruit juices high in Vitamin C, tea, gelatin, or broths and soups. These help to keep mucus thin and ease congestion.                  -Use salt water gargle, cough drops or throat sprays to relieve throat pain. Mi ¼ to ½ teaspoon of salt in 1 cup of warm water for a salt water gargle  solution.                  -Use petroleum jelly or lip balm around lips and nose to prevent chapping.                  -Use saline nose drops or spray to help ease congestion.    Over the Counter (OTC) Medicines:  Take over the counter medicines as needed to ease your signs.  Read labels carefully.  Use a product that treats only the signs that you have. Ask your pharmacist  for recommendations. Be sure to ask about possible interactions with other  medicines you are taking.  Common medicines used to treat signs of a cold include:     -Flonase daily.  -Claritin or Zyrtec daily.  -Mucinex every 12 hours -- Drink plenty of water while taking this medication.    - Cough suppressant, also called antitussive, such as dextromethorphan.  This medicine decreases your reflex and sensitivity to cough. This  medicine may be kept behind the pharmacy counter for purchase.    Cold and cough medicines often contain more than one type of medicine.  Ask the pharmacist for help to confirm that you are not using more than one  product with the same or similar ingredient. For example, some cold and  cough medicines have acetaminophen or ibuprofen in them to help lower a  fever or ease muscle aches. Do not take extra acetaminophen  (Tylenol) or  ibuprofen (Advil, Motrin) if the cold or cough medicine has it as an  ingredient. Too much medicine could be harmful.    Take the correct dose as listed on the package. Do not take more than  recommended.    Use a Humidifier:  A cool mist humidifier can make breathing easier by thinning mucus. Do not use  a steam humidifier as hot water can cause burns if spilled.  Place the humidifier a few feet from the bed. Drain and clean each day with  soap and water to prevent bacteria and mold from growing.  Indoor humidity should not be above 50%. Stop using the humidifier if you  notice moisture on windows, walls or pictures.  You do not need to add any medicine to the humidifier.  If you cannot get a humidifier, place a pan of water next to heating vents and  refill the water level daily. The water will evaporate and add moisture to the  Room.    How to prevent the spread of colds  -Wash your hands with soap and water or use alcohol based hand   often. Dry hands wet from washing with soap on a paper towel instead of cloth towel.  -Cough or sneeze into your elbow to avoid spreading germs.  -Wipe down common surfaces, such as door knobs and faucet handles, with a disinfectant spray.  -Do not share cups or utensils.        Please follow up with your Primary care provider within 2-5 days if your signs and symptoms have not resolved or worsen.      If your condition worsens or fails to improve we recommend that you receive another evaluation at the emergency room immediately or contact your primary medical clinic to discuss your concerns.   You must understand that you have received an Urgent Care treatment only and that you may be released before all of your medical problems are known or treated. You, the patient, will arrange for follow up care as instructed.

## 2024-09-27 ENCOUNTER — TELEPHONE (OUTPATIENT)
Dept: URGENT CARE | Facility: CLINIC | Age: 44
End: 2024-09-27
Payer: COMMERCIAL

## 2025-08-28 ENCOUNTER — OFFICE VISIT (OUTPATIENT)
Dept: URGENT CARE | Facility: CLINIC | Age: 45
End: 2025-08-28
Payer: COMMERCIAL

## 2025-08-28 VITALS
OXYGEN SATURATION: 96 % | HEART RATE: 99 BPM | HEIGHT: 64 IN | SYSTOLIC BLOOD PRESSURE: 130 MMHG | WEIGHT: 223.56 LBS | BODY MASS INDEX: 38.17 KG/M2 | TEMPERATURE: 99 F | DIASTOLIC BLOOD PRESSURE: 63 MMHG | RESPIRATION RATE: 20 BRPM

## 2025-08-28 DIAGNOSIS — J02.9 SORE THROAT: Primary | ICD-10-CM

## 2025-08-28 DIAGNOSIS — R09.89 SYMPTOMS OF UPPER RESPIRATORY INFECTION (URI): ICD-10-CM

## 2025-08-28 LAB
CTP QC/QA: YES
MOLECULAR STREP A: NEGATIVE
POC MOLECULAR INFLUENZA A AGN: NEGATIVE
POC MOLECULAR INFLUENZA B AGN: NEGATIVE
SARS-COV+SARS-COV-2 AG RESP QL IA.RAPID: NEGATIVE

## 2025-08-28 RX ORDER — CHLORTHALIDONE 25 MG/1
25 TABLET ORAL EVERY MORNING
COMMUNITY
Start: 2025-06-04 | End: 2026-07-29

## 2025-08-28 RX ORDER — FLUTICASONE PROPIONATE 50 MCG
1 SPRAY, SUSPENSION (ML) NASAL 2 TIMES DAILY PRN
Qty: 16 G | Refills: 0 | Status: SHIPPED | OUTPATIENT
Start: 2025-08-28 | End: 2025-09-07

## 2025-08-28 RX ORDER — BENZONATATE 200 MG/1
200 CAPSULE ORAL 3 TIMES DAILY PRN
Qty: 30 CAPSULE | Refills: 0 | Status: SHIPPED | OUTPATIENT
Start: 2025-08-28 | End: 2025-09-07

## 2025-08-28 RX ORDER — LEVOCETIRIZINE DIHYDROCHLORIDE 5 MG/1
5 TABLET, FILM COATED ORAL NIGHTLY PRN
Qty: 10 TABLET | Refills: 0 | Status: SHIPPED | OUTPATIENT
Start: 2025-08-28 | End: 2025-09-07

## 2025-08-30 ENCOUNTER — TELEPHONE (OUTPATIENT)
Dept: URGENT CARE | Facility: CLINIC | Age: 45
End: 2025-08-30
Payer: COMMERCIAL